# Patient Record
Sex: MALE | URBAN - NONMETROPOLITAN AREA
[De-identification: names, ages, dates, MRNs, and addresses within clinical notes are randomized per-mention and may not be internally consistent; named-entity substitution may affect disease eponyms.]

---

## 2021-08-23 ENCOUNTER — DOCTOR'S OFFICE (OUTPATIENT)
Dept: URBAN - NONMETROPOLITAN AREA CLINIC 1 | Facility: CLINIC | Age: 56
Setting detail: OPHTHALMOLOGY
End: 2021-08-23
Payer: COMMERCIAL

## 2021-08-23 ENCOUNTER — RX ONLY (RX ONLY)
Age: 56
End: 2021-08-23

## 2021-08-23 DIAGNOSIS — I63.9: ICD-10-CM

## 2021-08-23 DIAGNOSIS — H52.4: ICD-10-CM

## 2021-08-23 DIAGNOSIS — H52.03: ICD-10-CM

## 2021-08-23 PROCEDURE — 92004 COMPRE OPH EXAM NEW PT 1/>: CPT | Performed by: OPTOMETRIST

## 2021-08-23 PROCEDURE — 92083 EXTENDED VISUAL FIELD XM: CPT | Performed by: OPTOMETRIST

## 2021-08-23 PROCEDURE — S0620 ROUTINE OPHTHALMOLOGICAL EXA: HCPCS | Performed by: OPTOMETRIST

## 2021-08-23 PROCEDURE — 92015 DETERMINE REFRACTIVE STATE: CPT | Performed by: OPTOMETRIST

## 2021-08-23 ASSESSMENT — SPHEQUIV_DERIVED
OD_SPHEQUIV: 2.125
OD_SPHEQUIV: 2.125
OS_SPHEQUIV: 2.375
OS_SPHEQUIV: 2.625

## 2021-08-23 ASSESSMENT — REFRACTION_MANIFEST
OS_CYLINDER: -0.75
OD_VA1: 20/20-2
OD_SPHERE: +2.50
OD_VA2: 20/20-2
OD_AXIS: 095
OS_SPHERE: +2.75
OS_VA1: 20/20-2
OS_AXIS: 110
OD_ADD: +2.50
OS_ADD: +2.50
OS_VA2: 20/20-2
OD_CYLINDER: -0.75

## 2021-08-23 ASSESSMENT — REFRACTION_CURRENTRX
OD_OVR_VA: 20/
OD_AXIS: 89
OD_ADD: +2.00
OS_ADD: +2.00
OS_OVR_VA: 20/
OD_CYLINDER: -0.75
OS_SPHERE: +2.25
OD_SPHERE: +2.25
OS_CYLINDER: SPH

## 2021-08-23 ASSESSMENT — REFRACTION_AUTOREFRACTION
OD_CYLINDER: -0.75
OS_SPHERE: +3.00
OD_AXIS: 95
OS_AXIS: 111
OD_SPHERE: +2.50
OS_CYLINDER: -0.75

## 2021-08-23 ASSESSMENT — CONFRONTATIONAL VISUAL FIELD TEST (CVF)
OD_FINDINGS: CONSTRICTION
OS_FINDINGS: CONSTRICTION

## 2021-08-23 ASSESSMENT — VISUAL ACUITY
OS_BCVA: 20/30+2
OD_BCVA: 20/25-1

## 2022-08-24 ENCOUNTER — DOCTOR'S OFFICE (OUTPATIENT)
Dept: URBAN - NONMETROPOLITAN AREA CLINIC 1 | Facility: CLINIC | Age: 57
Setting detail: OPHTHALMOLOGY
End: 2022-08-24
Payer: COMMERCIAL

## 2022-08-24 ENCOUNTER — OPTICAL OFFICE (OUTPATIENT)
Dept: URBAN - NONMETROPOLITAN AREA CLINIC 4 | Facility: CLINIC | Age: 57
Setting detail: OPHTHALMOLOGY
End: 2022-08-24
Payer: COMMERCIAL

## 2022-08-24 DIAGNOSIS — H52.03: ICD-10-CM

## 2022-08-24 DIAGNOSIS — I63.9: ICD-10-CM

## 2022-08-24 DIAGNOSIS — H52.223: ICD-10-CM

## 2022-08-24 DIAGNOSIS — H52.4: ICD-10-CM

## 2022-08-24 PROCEDURE — V2781 PROGRESSIVE LENS PER LENS: HCPCS | Performed by: OPTOMETRIST

## 2022-08-24 PROCEDURE — 92083 EXTENDED VISUAL FIELD XM: CPT | Performed by: OPTOMETRIST

## 2022-08-24 PROCEDURE — V2020 VISION SVCS FRAMES PURCHASES: HCPCS | Performed by: OPTOMETRIST

## 2022-08-24 PROCEDURE — 92015 DETERMINE REFRACTIVE STATE: CPT | Performed by: OPTOMETRIST

## 2022-08-24 PROCEDURE — S0621 ROUTINE OPHTHALMOLOGICAL EXA: HCPCS | Performed by: OPTOMETRIST

## 2022-08-24 PROCEDURE — 92014 COMPRE OPH EXAM EST PT 1/>: CPT | Performed by: OPTOMETRIST

## 2022-08-24 ASSESSMENT — REFRACTION_CURRENTRX
OS_VPRISM_DIRECTION: SV
OS_SPHERE: +1.50
OD_CYLINDER: 0.00
OD_AXIS: 180
OD_OVR_VA: 20/
OD_SPHERE: +1.50
OS_OVR_VA: 20/
OS_CYLINDER: 0.00
OS_AXIS: 180
OD_VPRISM_DIRECTION: SV

## 2022-08-24 ASSESSMENT — SPHEQUIV_DERIVED
OS_SPHEQUIV: 2.625
OD_SPHEQUIV: 2.125
OS_SPHEQUIV: 2.375
OD_SPHEQUIV: 2.125

## 2022-08-24 ASSESSMENT — REFRACTION_MANIFEST
OD_SPHERE: +2.50
OD_VA1: 20/20-2
OD_AXIS: 095
OD_CYLINDER: -0.75
OS_VA2: 20/20-2
OS_CYLINDER: -0.75
OS_AXIS: 110
OS_ADD: +2.50
OS_SPHERE: +2.75
OS_VA1: 20/20-2
OD_VA2: 20/20-2
OD_ADD: +2.50

## 2022-08-24 ASSESSMENT — REFRACTION_AUTOREFRACTION
OS_CYLINDER: -0.75
OS_SPHERE: +3.00
OD_SPHERE: +2.50
OD_AXIS: 95
OD_CYLINDER: -0.75
OS_AXIS: 111

## 2022-08-24 ASSESSMENT — CONFRONTATIONAL VISUAL FIELD TEST (CVF)
OS_FINDINGS: CONSTRICTION
OD_FINDINGS: CONSTRICTION

## 2022-08-24 ASSESSMENT — TONOMETRY
OS_IOP_MMHG: 14
OD_IOP_MMHG: 14

## 2022-08-24 ASSESSMENT — VISUAL ACUITY
OD_BCVA: 20/40+2
OS_BCVA: 20/30-2

## 2023-03-30 ENCOUNTER — HOSPITAL ENCOUNTER (INPATIENT)
Facility: HOSPITAL | Age: 58
LOS: 3 days | Discharge: HOME | End: 2023-04-02
Attending: EMERGENCY MEDICINE | Admitting: FAMILY MEDICINE

## 2023-03-30 ENCOUNTER — APPOINTMENT (EMERGENCY)
Dept: CT IMAGING | Facility: HOSPITAL | Age: 58
End: 2023-03-30

## 2023-03-30 DIAGNOSIS — I25.10 CORONARY ARTERY DISEASE INVOLVING NATIVE CORONARY ARTERY OF NATIVE HEART WITHOUT ANGINA PECTORIS: ICD-10-CM

## 2023-03-30 DIAGNOSIS — R10.84 GENERALIZED ABDOMINAL PAIN: Primary | ICD-10-CM

## 2023-03-30 DIAGNOSIS — K56.609 SMALL BOWEL OBSTRUCTION (HCC): ICD-10-CM

## 2023-03-30 LAB
ALBUMIN SERPL BCP-MCNC: 4.3 G/DL (ref 3.5–5)
ALP SERPL-CCNC: 125 U/L (ref 34–104)
ALT SERPL W P-5'-P-CCNC: 19 U/L (ref 7–52)
ANION GAP SERPL CALCULATED.3IONS-SCNC: 9 MMOL/L (ref 4–13)
AST SERPL W P-5'-P-CCNC: 9 U/L (ref 13–39)
BASOPHILS # BLD AUTO: 0.05 THOUSANDS/ÂΜL (ref 0–0.1)
BASOPHILS NFR BLD AUTO: 0 % (ref 0–1)
BILIRUB SERPL-MCNC: 0.48 MG/DL (ref 0.2–1)
BUN SERPL-MCNC: 11 MG/DL (ref 5–25)
CALCIUM SERPL-MCNC: 9.8 MG/DL (ref 8.4–10.2)
CHLORIDE SERPL-SCNC: 105 MMOL/L (ref 96–108)
CO2 SERPL-SCNC: 27 MMOL/L (ref 21–32)
CREAT SERPL-MCNC: 0.96 MG/DL (ref 0.6–1.3)
EOSINOPHIL # BLD AUTO: 0.21 THOUSAND/ÂΜL (ref 0–0.61)
EOSINOPHIL NFR BLD AUTO: 1 % (ref 0–6)
ERYTHROCYTE [DISTWIDTH] IN BLOOD BY AUTOMATED COUNT: 13.5 % (ref 11.6–15.1)
GFR SERPL CREATININE-BSD FRML MDRD: 86 ML/MIN/1.73SQ M
GLUCOSE SERPL-MCNC: 102 MG/DL (ref 65–140)
HCT VFR BLD AUTO: 51.2 % (ref 36.5–49.3)
HGB BLD-MCNC: 16.4 G/DL (ref 12–17)
IMM GRANULOCYTES # BLD AUTO: 0.04 THOUSAND/UL (ref 0–0.2)
IMM GRANULOCYTES NFR BLD AUTO: 0 % (ref 0–2)
LIPASE SERPL-CCNC: 18 U/L (ref 11–82)
LYMPHOCYTES # BLD AUTO: 2.83 THOUSANDS/ÂΜL (ref 0.6–4.47)
LYMPHOCYTES NFR BLD AUTO: 19 % (ref 14–44)
MCH RBC QN AUTO: 28.5 PG (ref 26.8–34.3)
MCHC RBC AUTO-ENTMCNC: 32 G/DL (ref 31.4–37.4)
MCV RBC AUTO: 89 FL (ref 82–98)
MONOCYTES # BLD AUTO: 0.85 THOUSAND/ÂΜL (ref 0.17–1.22)
MONOCYTES NFR BLD AUTO: 6 % (ref 4–12)
NEUTROPHILS # BLD AUTO: 10.93 THOUSANDS/ÂΜL (ref 1.85–7.62)
NEUTS SEG NFR BLD AUTO: 74 % (ref 43–75)
NRBC BLD AUTO-RTO: 0 /100 WBCS
PLATELET # BLD AUTO: 350 THOUSANDS/UL (ref 149–390)
PMV BLD AUTO: 9.5 FL (ref 8.9–12.7)
POTASSIUM SERPL-SCNC: 3.9 MMOL/L (ref 3.5–5.3)
PROT SERPL-MCNC: 6.8 G/DL (ref 6.4–8.4)
RBC # BLD AUTO: 5.76 MILLION/UL (ref 3.88–5.62)
SODIUM SERPL-SCNC: 141 MMOL/L (ref 135–147)
WBC # BLD AUTO: 14.91 THOUSAND/UL (ref 4.31–10.16)

## 2023-03-30 RX ORDER — ALBUTEROL SULFATE 90 UG/1
2 AEROSOL, METERED RESPIRATORY (INHALATION) 4 TIMES DAILY
COMMUNITY
Start: 2022-11-18

## 2023-03-30 RX ORDER — SERTRALINE HYDROCHLORIDE 25 MG/1
25 TABLET, FILM COATED ORAL DAILY
COMMUNITY
Start: 2022-12-29

## 2023-03-30 RX ORDER — SODIUM CHLORIDE, SODIUM GLUCONATE, SODIUM ACETATE, POTASSIUM CHLORIDE, MAGNESIUM CHLORIDE, SODIUM PHOSPHATE, DIBASIC, AND POTASSIUM PHOSPHATE .53; .5; .37; .037; .03; .012; .00082 G/100ML; G/100ML; G/100ML; G/100ML; G/100ML; G/100ML; G/100ML
75 INJECTION, SOLUTION INTRAVENOUS CONTINUOUS
Status: DISCONTINUED | OUTPATIENT
Start: 2023-03-30 | End: 2023-04-01

## 2023-03-30 RX ORDER — FENTANYL CITRATE 50 UG/ML
100 INJECTION, SOLUTION INTRAMUSCULAR; INTRAVENOUS ONCE
Status: COMPLETED | OUTPATIENT
Start: 2023-03-30 | End: 2023-03-30

## 2023-03-30 RX ORDER — MORPHINE SULFATE 4 MG/ML
4 INJECTION, SOLUTION INTRAMUSCULAR; INTRAVENOUS ONCE
Status: COMPLETED | OUTPATIENT
Start: 2023-03-30 | End: 2023-03-30

## 2023-03-30 RX ORDER — ATORVASTATIN CALCIUM 40 MG/1
40 TABLET, FILM COATED ORAL DAILY
COMMUNITY
Start: 2022-12-29

## 2023-03-30 RX ORDER — BISACODYL 10 MG
10 SUPPOSITORY, RECTAL RECTAL
COMMUNITY

## 2023-03-30 RX ORDER — ESOMEPRAZOLE MAGNESIUM 40 MG/1
1 CAPSULE, DELAYED RELEASE ORAL
COMMUNITY
Start: 2023-01-13

## 2023-03-30 RX ORDER — LORAZEPAM 2 MG/ML
0.5 INJECTION INTRAMUSCULAR EVERY 6 HOURS PRN
Status: DISCONTINUED | OUTPATIENT
Start: 2023-03-30 | End: 2023-04-02 | Stop reason: HOSPADM

## 2023-03-30 RX ORDER — ONDANSETRON 2 MG/ML
INJECTION INTRAMUSCULAR; INTRAVENOUS
Status: COMPLETED
Start: 2023-03-30 | End: 2023-03-30

## 2023-03-30 RX ORDER — EZETIMIBE 10 MG/1
10 TABLET ORAL DAILY
COMMUNITY
Start: 2023-03-14

## 2023-03-30 RX ORDER — ASPIRIN 81 MG/1
81 TABLET, CHEWABLE ORAL DAILY
COMMUNITY
Start: 2023-03-14

## 2023-03-30 RX ORDER — ONDANSETRON 2 MG/ML
4 INJECTION INTRAMUSCULAR; INTRAVENOUS ONCE
Status: COMPLETED | OUTPATIENT
Start: 2023-03-30 | End: 2023-03-30

## 2023-03-30 RX ORDER — POLYETHYLENE GLYCOL 3350 17 G/17G
17 POWDER, FOR SOLUTION ORAL
COMMUNITY

## 2023-03-30 RX ORDER — FLUTICASONE PROPIONATE 50 MCG
2 SPRAY, SUSPENSION (ML) NASAL DAILY
COMMUNITY
Start: 2023-03-14

## 2023-03-30 RX ORDER — ALBUTEROL SULFATE 90 UG/1
2 AEROSOL, METERED RESPIRATORY (INHALATION) EVERY 4 HOURS PRN
Status: DISCONTINUED | OUTPATIENT
Start: 2023-03-31 | End: 2023-04-02 | Stop reason: HOSPADM

## 2023-03-30 RX ORDER — LISINOPRIL 10 MG/1
10 TABLET ORAL DAILY
Status: ON HOLD | COMMUNITY
Start: 2022-12-30 | End: 2023-04-02 | Stop reason: SDUPTHER

## 2023-03-30 RX ORDER — NAPROXEN 375 MG/1
375 TABLET ORAL 2 TIMES DAILY
COMMUNITY
Start: 2022-12-29

## 2023-03-30 RX ORDER — ENOXAPARIN SODIUM 100 MG/ML
40 INJECTION SUBCUTANEOUS DAILY
Status: DISCONTINUED | OUTPATIENT
Start: 2023-03-31 | End: 2023-03-31

## 2023-03-30 RX ORDER — IBUPROFEN 600 MG/1
600 TABLET ORAL EVERY 6 HOURS PRN
COMMUNITY
Start: 2022-10-07 | End: 2023-10-07

## 2023-03-30 RX ORDER — PANTOPRAZOLE SODIUM 40 MG/10ML
40 INJECTION, POWDER, LYOPHILIZED, FOR SOLUTION INTRAVENOUS
Status: DISCONTINUED | OUTPATIENT
Start: 2023-03-31 | End: 2023-04-02 | Stop reason: HOSPADM

## 2023-03-30 RX ORDER — ONDANSETRON 2 MG/ML
4 INJECTION INTRAMUSCULAR; INTRAVENOUS EVERY 6 HOURS PRN
Status: DISCONTINUED | OUTPATIENT
Start: 2023-03-30 | End: 2023-04-02 | Stop reason: HOSPADM

## 2023-03-30 RX ADMIN — FENTANYL CITRATE 100 MCG: 50 INJECTION INTRAMUSCULAR; INTRAVENOUS at 21:12

## 2023-03-30 RX ADMIN — SODIUM CHLORIDE, SODIUM GLUCONATE, SODIUM ACETATE, POTASSIUM CHLORIDE AND MAGNESIUM CHLORIDE 75 ML/HR: 526; 502; 368; 37; 30 INJECTION, SOLUTION INTRAVENOUS at 22:49

## 2023-03-30 RX ADMIN — ONDANSETRON 4 MG: 2 INJECTION INTRAMUSCULAR; INTRAVENOUS at 19:18

## 2023-03-30 RX ADMIN — IOHEXOL 100 ML: 350 INJECTION, SOLUTION INTRAVENOUS at 19:40

## 2023-03-30 RX ADMIN — MORPHINE SULFATE 4 MG: 4 INJECTION INTRAVENOUS at 19:12

## 2023-03-30 NOTE — ED PROVIDER NOTES
History  Chief Complaint   Patient presents with   • Abdominal Pain     Abd pain sudden onset @ 8am today after BM  HX small bowel obstr  49-year-old male with a history of previous bowel obstruction presents emergency room complaining of severe abdominal pain which began after a bowel movement around 8:00 this morning  Is been continuous and nonradiating  Patient has no fevers or chills  No urinary complaints  Has had no nausea or vomiting  No diarrhea  Patient appears uncomfortable at this time  Patient has a history as well of myocardial infarction and CVA with residual left-sided paralysis  History provided by:  Patient  Abdominal Pain  Pain location:  Generalized  Pain quality: aching and bloating    Pain radiates to:  Does not radiate  Pain severity:  Severe  Onset quality:  Sudden  Duration:  11 hours  Timing:  Constant  Progression:  Worsening  Chronicity:  Recurrent  Context: not previous surgeries    Relieved by:  None tried  Worsened by:  Palpation and movement  Ineffective treatments:  None tried  Associated symptoms: flatus    Associated symptoms: no cough, no diarrhea, no dysuria, no hematemesis, no hematochezia, no nausea, no shortness of breath and no vomiting    Risk factors: has not had multiple surgeries        Prior to Admission Medications   Prescriptions Last Dose Informant Patient Reported? Taking?    albuterol (PROVENTIL HFA,VENTOLIN HFA) 90 mcg/act inhaler 3/30/2023  Yes Yes   Sig: Inhale 2 puffs 4 (four) times a day   aspirin 81 mg chewable tablet 3/30/2023  Yes Yes   Sig: Chew 81 mg daily   atorvastatin (LIPITOR) 40 mg tablet 3/30/2023  Yes Yes   Sig: Take 40 mg by mouth daily   bisacodyl (DULCOLAX) 10 mg suppository 3/30/2023  Yes Yes   Sig: Insert 10 mg into the rectum   esomeprazole (NexIUM) 40 MG capsule 3/30/2023  Yes Yes   Sig: Take 1 capsule by mouth daily before breakfast   ezetimibe (ZETIA) 10 mg tablet 3/30/2023  Yes Yes   Sig: Take 10 mg by mouth daily fluticasone (FLONASE) 50 mcg/act nasal spray 3/30/2023  Yes Yes   Si sprays into each nostril daily   ibuprofen (MOTRIN) 600 mg tablet 3/30/2023  Yes Yes   Sig: Take 600 mg by mouth every 6 (six) hours as needed   lisinopril (ZESTRIL) 10 mg tablet 3/30/2023  Yes Yes   Sig: Take 10 mg by mouth daily   naproxen (NAPROSYN) 375 mg tablet 3/30/2023  Yes Yes   Sig: Take 375 mg by mouth 2 (two) times a day   polyethylene glycol (GLYCOLAX) 17 GM/SCOOP powder 3/30/2023  Yes Yes   Sig: Take 17 g by mouth   sertraline (ZOLOFT) 25 mg tablet 3/30/2023  Yes Yes   Sig: Take 25 mg by mouth daily      Facility-Administered Medications: None       Past Medical History:   Diagnosis Date   • Bowel obstruction (HCC)    • MI (myocardial infarction) ([facility] [address] )    • Stroke ([facility] )        No past surgical history on file  No family history on file  I have reviewed and agree with the history as documented  E-Cigarette/Vaping   • E-Cigarette Use Never User      E-Cigarette/Vaping Substances     Social History     Tobacco Use   • Smoking status: Every Day     Packs/day: 0 50     Types: Cigarettes   Vaping Use   • Vaping Use: Never used   Substance Use Topics   • Alcohol use: Never   • Drug use: Never       Review of Systems   Respiratory: Negative  Negative for cough and shortness of breath  Cardiovascular: Negative  Gastrointestinal: Positive for abdominal pain and flatus  Negative for diarrhea, hematemesis, hematochezia, nausea and vomiting  Genitourinary: Negative  Negative for dysuria, flank pain, frequency and urgency  Musculoskeletal: Negative for back pain and myalgias  All other systems reviewed and are negative  Physical Exam  Physical Exam  Vitals and nursing note reviewed  Constitutional:       General: He is in acute distress  Appearance: Normal appearance  He is well-developed  He is not ill-appearing or toxic-appearing  HENT:      Head: Normocephalic and atraumatic   Hair is normal       Jaw: No pain on movement  Right Ear: External ear normal       Left Ear: External ear normal       Nose: Nose normal  No congestion  Mouth/Throat:      Mouth: Mucous membranes are moist       Dentition: Dental caries present  Eyes:      General: Lids are normal  No scleral icterus  Extraocular Movements: Extraocular movements intact  Conjunctiva/sclera: Conjunctivae normal       Pupils: Pupils are equal, round, and reactive to light  Cardiovascular:      Rate and Rhythm: Normal rate and regular rhythm  Heart sounds: Normal heart sounds  No murmur heard  Pulmonary:      Effort: Pulmonary effort is normal  No respiratory distress  Breath sounds: Normal breath sounds  No decreased breath sounds, wheezing, rhonchi or rales  Abdominal:      General: Abdomen is flat  Bowel sounds are normal  There is no distension  Palpations: Abdomen is soft  Abdomen is not rigid  Tenderness: There is generalized abdominal tenderness  There is no right CVA tenderness, left CVA tenderness, guarding or rebound  Musculoskeletal:         General: No swelling, tenderness, deformity or signs of injury  Normal range of motion  Cervical back: Normal range of motion and neck supple  Skin:     General: Skin is warm and dry  Coloration: Skin is not pale  Findings: No rash  Neurological:      General: No focal deficit present  Mental Status: He is alert and oriented to person, place, and time  Mental status is at baseline     Psychiatric:         Attention and Perception: Attention normal          Mood and Affect: Mood normal          Speech: Speech normal          Behavior: Behavior normal          Vital Signs  ED Triage Vitals [03/30/23 1900]   Temp Pulse Respirations Blood Pressure SpO2   -- 69 (!) 24 150/98 97 %      Temp src Heart Rate Source Patient Position - Orthostatic VS BP Location FiO2 (%)   -- -- -- -- --      Pain Score       10 - Worst Possible Pain           Vitals: 03/30/23 1915 03/30/23 1930 03/30/23 2000 03/30/23 2015   BP: 133/76 110/69 152/86 (!) 173/93   Pulse: 64 60 58 75         Visual Acuity      ED Medications  Medications   fentanyl citrate (PF) 100 MCG/2ML 100 mcg (has no administration in time range)   morphine injection 4 mg (4 mg Intravenous Given 3/30/23 1912)   ondansetron (ZOFRAN) injection 4 mg (4 mg Intravenous Given 3/30/23 1918)   iohexol (OMNIPAQUE) 350 MG/ML injection (SINGLE-DOSE) 100 mL (100 mL Intravenous Given 3/30/23 1940)       Diagnostic Studies  Results Reviewed     Procedure Component Value Units Date/Time    Comprehensive metabolic panel [[id number]]  (Abnormal) Collected: 03/30/23 1909    Lab Status: Final result Specimen: Blood from Arm, Left Updated: 03/30/23 1930     Sodium 141 mmol/L      Potassium 3 9 mmol/L      Chloride 105 mmol/L      CO2 27 mmol/L      ANION GAP 9 mmol/L      BUN 11 mg/dL      Creatinine 0 96 mg/dL      Glucose 102 mg/dL      Calcium 9 8 mg/dL      AST 9 U/L      ALT 19 U/L      Alkaline Phosphatase 125 U/L      Total Protein 6 8 g/dL      Albumin 4 3 g/dL      Total Bilirubin 0 48 mg/dL      eGFR 86 ml/min/1 73sq m     Narrative:      [place] guidelines for Chronic Kidney Disease (CKD):   •  Stage 1 with normal or high GFR (GFR > 90 mL/min/1 73 square meters)  •  Stage 2 Mild CKD (GFR = 60-89 mL/min/1 73 square meters)  •  Stage 3A Moderate CKD (GFR = 45-59 mL/min/1 73 square meters)  •  Stage 3B Moderate CKD (GFR = 30-44 mL/min/1 73 square meters)  •  Stage 4 Severe CKD (GFR = 15-29 mL/min/1 73 square meters)  •  Stage 5 End Stage CKD (GFR <15 mL/min/1 73 square meters)  Note: GFR calculation is accurate only with a steady state creatinine    Lipase [[id number]]  (Normal) Collected: 03/30/23 1909    Lab Status: Final result Specimen: Blood from Arm, Left Updated: 03/30/23 1930     Lipase 18 u/L     CBC and differential [[id number]]  (Abnormal) Collected: 03/30/23 9154    Lab Status: Final result Specimen: Blood from Arm, Left Updated: 03/30/23 1915     WBC 14 91 Thousand/uL      RBC 5 76 Million/uL      Hemoglobin 16 4 g/dL      Hematocrit 51 2 %      MCV 89 fL      MCH 28 5 pg      MCHC 32 0 g/dL      RDW 13 5 %      MPV 9 5 fL      Platelets 870 Thousands/uL      nRBC 0 /100 WBCs      Neutrophils Relative 74 %      Immat GRANS % 0 %      Lymphocytes Relative 19 %      Monocytes Relative 6 %      Eosinophils Relative 1 %      Basophils Relative 0 %      Neutrophils Absolute 10 93 Thousands/µL      Immature Grans Absolute 0 04 Thousand/uL      Lymphocytes Absolute 2 83 Thousands/µL      Monocytes Absolute 0 85 Thousand/µL      Eosinophils Absolute 0 21 Thousand/µL      Basophils Absolute 0 05 Thousands/µL                  CT abdomen pelvis with contrast   Final Result by Vinh Bass MD (03/30 2050)      Multiple dilated loops of small bowel with transition point in the right lower quadrant compatible with small bowel obstruction  Transition point is at a loop of small bowel demonstrating moderate wall thickening, underlying infectious or inflammatory    enteritis is not excluded  The study was marked in [facility] for immediate notification  Workstation performed: [id number]                    Procedures  Procedures         ED Course  ED Course as of 03/30/23 2111   Thu Mar 30, 2023   2055 CT consistent with small bowel obstruction  2106 Discussed case with surgery  Patient may be admitted to our facility  Will refer to medicine  Patient should be n p o  and NG tube if he continues to vomit  SBIRT 20yo+    Flowsheet Row Most Recent Value   SBIRT (23 yo +)    In order to provide better care to our patients, we are screening all of our patients for alcohol and drug use  Would it be okay to ask you these screening questions? Yes Filed at: 03/30/2023 1901   Initial Alcohol Screen: US AUDIT-C     1   How often do you have a drink containing alcohol? 0 Filed at: 03/30/2023 1901   2  How many drinks containing alcohol do you have on a typical day you are drinking? 0 Filed at: 03/30/2023 1901   3a  Male UNDER 65: How often do you have five or more drinks on one occasion? 0 Filed at: 03/30/2023 1901   Audit-C Score 0 Filed at: 03/30/2023 1901   TONY: How many times in the past year have you    Used an illegal drug or used a prescription medication for non-medical reasons? Never Filed at: 03/30/2023 1901                    Medical Decision Making  Patient presented to the emergency department and a MSE was performed  The patient was evaluated for complaint related to acute abdominal pain in a male patient  Patient is potentially at risk for, but not limited to, acute gastritis, pancreatitis, biliary colic, cholecystitis, urinary infection, kidney stone, appendicitis, diverticulitis, diverticulosis, ulcerative colitis, Crohn's disease, enteritis, bowel obstruction, viral gastroenteritis, constipation, or other disease process unrelated to the abdomen which may cause this symptomatology is also considered  Several of these diagnoses have been evaluated and ruled out by history and physical   As needed, patient will be further evaluated with laboratory and imaging studies  Higher level diagnostics, such as CT imaging or ultrasound, may also be required  Please see work-up portion of the note for further evaluation of patient's risk  Socioeconomic factors were also considered as part of the decision-making process  Unless otherwise stated in the chart or patient is admitted as elsewhere documented, any previously prescribed medications will be maintained  Generalized abdominal pain: acute illness or injury  Small bowel obstruction ([facility] [address] ): acute illness or injury  Amount and/or Complexity of Data Reviewed  Labs: ordered  Decision-making details documented in ED Course  Radiology: ordered  Decision-making details documented in ED Course        Risk  Prescription drug management  Decision regarding hospitalization  Risk Details: Patient presented to the emergency department and a MSE was performed  The patient was evaluated and diagnosed with acute bowel obstruction  This is a new issue that will require additional planned work-up and treatment in a hospitalized setting  As may have been required as part of this evaluation, clinical laboratory test, radiology imaging and medical testing (I e  EKG) were ordered as necessitated by the patient's presentation  I independently reviewed these studies, imaging and testing  This patient's case is considered to be a considerable risk secondary to the above listed disease process and poses a threat to the patient's well-being and baseline function  Further in-patient diagnostic testing and management, which may include the administration of parenteral medications, is required  Disposition  Final diagnoses:   Generalized abdominal pain   Small bowel obstruction ([address] )     Time reflects when diagnosis was documented in both MDM as applicable and the Disposition within this note     Time User Action Codes Description Comment    3/30/2023  9:08 PM Kris Bright Add [R10 84] Generalized abdominal pain     3/30/2023  9:08 PM Kris Galarza [K56 [id number]] Small bowel obstruction [facility])       ED Disposition     ED Disposition   Admit    Condition   Stable    Date/Time   Thu Mar 30, 2023  9:10 PM    Comment              Follow-up Information    None         Patient's Medications   Discharge Prescriptions    No medications on file       No discharge procedures on file      PDMP Review     None          ED Provider  Electronically Signed by           Mc Mcginnis DO  03/30/23 6609

## 2023-03-31 PROBLEM — I69.359 CVA, OLD, HEMIPARESIS (HCC): Status: ACTIVE | Noted: 2023-03-31

## 2023-03-31 PROBLEM — I25.10 CORONARY ARTERY DISEASE INVOLVING NATIVE CORONARY ARTERY OF NATIVE HEART: Status: ACTIVE | Noted: 2023-03-31

## 2023-03-31 PROBLEM — Z72.0 TOBACCO ABUSE: Status: ACTIVE | Noted: 2023-03-31

## 2023-03-31 LAB
ANION GAP SERPL CALCULATED.3IONS-SCNC: 8 MMOL/L (ref 4–13)
BASOPHILS # BLD AUTO: 0.04 THOUSANDS/ÂΜL (ref 0–0.1)
BASOPHILS NFR BLD AUTO: 0 % (ref 0–1)
BUN SERPL-MCNC: 11 MG/DL (ref 5–25)
CALCIUM SERPL-MCNC: 9.1 MG/DL (ref 8.4–10.2)
CHLORIDE SERPL-SCNC: 107 MMOL/L (ref 96–108)
CO2 SERPL-SCNC: 23 MMOL/L (ref 21–32)
CREAT SERPL-MCNC: 0.86 MG/DL (ref 0.6–1.3)
EOSINOPHIL # BLD AUTO: 0.23 THOUSAND/ÂΜL (ref 0–0.61)
EOSINOPHIL NFR BLD AUTO: 2 % (ref 0–6)
ERYTHROCYTE [DISTWIDTH] IN BLOOD BY AUTOMATED COUNT: 13.5 % (ref 11.6–15.1)
GFR SERPL CREATININE-BSD FRML MDRD: 95 ML/MIN/1.73SQ M
GLUCOSE SERPL-MCNC: 103 MG/DL (ref 65–140)
HCT VFR BLD AUTO: 49.6 % (ref 36.5–49.3)
HGB BLD-MCNC: 15.8 G/DL (ref 12–17)
IMM GRANULOCYTES # BLD AUTO: 0.04 THOUSAND/UL (ref 0–0.2)
IMM GRANULOCYTES NFR BLD AUTO: 0 % (ref 0–2)
LYMPHOCYTES # BLD AUTO: 1.83 THOUSANDS/ÂΜL (ref 0.6–4.47)
LYMPHOCYTES NFR BLD AUTO: 17 % (ref 14–44)
MAGNESIUM SERPL-MCNC: 1.9 MG/DL (ref 1.9–2.7)
MCH RBC QN AUTO: 28.7 PG (ref 26.8–34.3)
MCHC RBC AUTO-ENTMCNC: 31.9 G/DL (ref 31.4–37.4)
MCV RBC AUTO: 90 FL (ref 82–98)
MONOCYTES # BLD AUTO: 0.73 THOUSAND/ÂΜL (ref 0.17–1.22)
MONOCYTES NFR BLD AUTO: 7 % (ref 4–12)
NEUTROPHILS # BLD AUTO: 7.99 THOUSANDS/ÂΜL (ref 1.85–7.62)
NEUTS SEG NFR BLD AUTO: 74 % (ref 43–75)
NRBC BLD AUTO-RTO: 0 /100 WBCS
PHOSPHATE SERPL-MCNC: 3.6 MG/DL (ref 2.7–4.5)
PLATELET # BLD AUTO: 317 THOUSANDS/UL (ref 149–390)
PMV BLD AUTO: 9.4 FL (ref 8.9–12.7)
POTASSIUM SERPL-SCNC: 4.7 MMOL/L (ref 3.5–5.3)
RBC # BLD AUTO: 5.51 MILLION/UL (ref 3.88–5.62)
SODIUM SERPL-SCNC: 138 MMOL/L (ref 135–147)
WBC # BLD AUTO: 10.86 THOUSAND/UL (ref 4.31–10.16)

## 2023-03-31 RX ORDER — HYDROMORPHONE HCL/PF 1 MG/ML
0.5 SYRINGE (ML) INJECTION
Status: DISCONTINUED | OUTPATIENT
Start: 2023-03-31 | End: 2023-04-02 | Stop reason: HOSPADM

## 2023-03-31 RX ORDER — MORPHINE SULFATE 4 MG/ML
4 INJECTION, SOLUTION INTRAMUSCULAR; INTRAVENOUS
Status: DISCONTINUED | OUTPATIENT
Start: 2023-03-31 | End: 2023-04-02 | Stop reason: HOSPADM

## 2023-03-31 RX ORDER — HEPARIN SODIUM 5000 [USP'U]/ML
5000 INJECTION, SOLUTION INTRAVENOUS; SUBCUTANEOUS EVERY 8 HOURS SCHEDULED
Status: DISCONTINUED | OUTPATIENT
Start: 2023-03-31 | End: 2023-04-02 | Stop reason: HOSPADM

## 2023-03-31 RX ORDER — HYDROMORPHONE HCL/PF 1 MG/ML
0.5 SYRINGE (ML) INJECTION ONCE
Status: COMPLETED | OUTPATIENT
Start: 2023-03-31 | End: 2023-03-31

## 2023-03-31 RX ADMIN — SODIUM CHLORIDE, SODIUM GLUCONATE, SODIUM ACETATE, POTASSIUM CHLORIDE AND MAGNESIUM CHLORIDE 75 ML/HR: 526; 502; 368; 37; 30 INJECTION, SOLUTION INTRAVENOUS at 21:20

## 2023-03-31 RX ADMIN — PANTOPRAZOLE SODIUM 40 MG: 40 INJECTION, POWDER, FOR SOLUTION INTRAVENOUS at 08:19

## 2023-03-31 RX ADMIN — SODIUM CHLORIDE, SODIUM GLUCONATE, SODIUM ACETATE, POTASSIUM CHLORIDE AND MAGNESIUM CHLORIDE 75 ML/HR: 526; 502; 368; 37; 30 INJECTION, SOLUTION INTRAVENOUS at 12:08

## 2023-03-31 RX ADMIN — HYDROMORPHONE HYDROCHLORIDE 0.5 MG: 1 INJECTION, SOLUTION INTRAMUSCULAR; INTRAVENOUS; SUBCUTANEOUS at 05:35

## 2023-03-31 RX ADMIN — HEPARIN SODIUM 5000 UNITS: 5000 INJECTION INTRAVENOUS; SUBCUTANEOUS at 21:20

## 2023-03-31 RX ADMIN — HYDROMORPHONE HYDROCHLORIDE 0.5 MG: 1 INJECTION, SOLUTION INTRAMUSCULAR; INTRAVENOUS; SUBCUTANEOUS at 01:18

## 2023-03-31 RX ADMIN — ENOXAPARIN SODIUM 40 MG: 40 INJECTION SUBCUTANEOUS at 08:19

## 2023-03-31 NOTE — ASSESSMENT & PLAN NOTE
· Reports 1/2 pack/day tobacco abuse  · Declines nicotine replacement therapy  · Cessation counseling provided
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
## 2023-03-31 NOTE — ASSESSMENT & PLAN NOTE
· POA with severe abdominal pain without vomiting  · CT abdomen pelvis with SBO, transition point right lower quadrant  · Hx SBO x2 in 2021 hospitalized at [facility] resolved with conservative measures  · No history abdominal surgery  · DVT prophylaxis enoxaparin, SCDs  · GI prophylaxis Protonix  · Continue conservative management, condition improving clinical exam benign    As per surgery recommendation, will advance clear liquid diet, and monitor

## 2023-03-31 NOTE — PLAN OF CARE
Problem: PAIN - ADULT  Goal: Verbalizes/displays adequate comfort level or baseline comfort level  Description: Interventions:  - Encourage patient to monitor pain and request assistance  - Assess pain using appropriate pain scale  - Administer analgesics based on type and severity of pain and evaluate response  - Implement non-pharmacological measures as appropriate and evaluate response  - Consider cultural and social influences on pain and pain management  - Notify physician/advanced practitioner if interventions unsuccessful or patient reports new pain  Outcome: Progressing     Problem: INFECTION - ADULT  Goal: Absence or prevention of progression during hospitalization  Description: INTERVENTIONS:  - Assess and monitor for signs and symptoms of infection  - Monitor lab/diagnostic results  - Monitor all insertion sites, i e  indwelling lines, tubes, and drains  - Monitor endotracheal if appropriate and nasal secretions for changes in amount and color  - [place] appropriate cooling/warming therapies per order  - Administer medications as ordered  - Instruct and encourage patient and family to use good hand hygiene technique  - Identify and instruct in appropriate isolation precautions for identified infection/condition  Outcome: Progressing     Problem: SAFETY ADULT  Goal: Patient will remain free of falls  Description: INTERVENTIONS:  - Educate patient/family on patient safety including physical limitations  - Instruct patient to call for assistance with activity   - Consult OT/PT to assist with strengthening/mobility   - Keep Call bell within reach  - Keep bed low and locked with side rails adjusted as appropriate  - Keep care items and personal belongings within reach  - Initiate and maintain comfort rounds  - Make Fall Risk Sign visible to staff    - Apply yellow socks and bracelet for high fall risk patients  - Consider moving patient to room near nurses station  Outcome: Progressing  Goal: Maintain or return to baseline ADL function  Description: INTERVENTIONS:  -  Assess patient's ability to carry out ADLs; assess patient's baseline for ADL function and identify physical deficits which impact ability to perform ADLs (bathing, care of mouth/teeth, toileting, grooming, dressing, etc )  - Assess/evaluate cause of self-care deficits   - Assess range of motion  - Assess patient's mobility; develop plan if impaired  - Assess patient's need for assistive devices and provide as appropriate  - Encourage maximum independence but intervene and supervise when necessary  - Involve family in performance of ADLs  - Assess for home care needs following discharge   - Consider OT consult to assist with ADL evaluation and planning for discharge  - Provide patient education as appropriate  Outcome: Progressing  Goal: Maintains/Returns to pre admission functional level  Description: INTERVENTIONS:  - Perform BMAT or MOVE assessment daily    - Set and communicate daily mobility goal to care team and patient/family/caregiver     - Collaborate with rehabilitation services on mobility goals if consulted    - Out of bed for toileting  - Record patient progress and toleration of activity level   Outcome: Progressing     Problem: DISCHARGE PLANNING  Goal: Discharge to home or other facility with appropriate resources  Description: INTERVENTIONS:  - Identify barriers to discharge w/patient and caregiver  - Arrange for needed discharge resources and transportation as appropriate  - Identify discharge learning needs (meds, wound care, etc )  - Arrange for interpretive services to assist at discharge as needed  - Refer to Case Management Department for coordinating discharge planning if the patient needs post-hospital services based on physician/advanced practitioner order or complex needs related to functional status, cognitive ability, or social support system  Outcome: Progressing     Problem: Knowledge Deficit  Goal: Patient/family/caregiver demonstrates understanding of disease process, treatment plan, medications, and discharge instructions  Description: Complete learning assessment and assess knowledge base    Interventions:  - Provide teaching at level of understanding  - Provide teaching via preferred learning methods  Outcome: Progressing     Problem: Prexisting or High Potential for Compromised Skin Integrity  Goal: Skin integrity is maintained or improved  Description: INTERVENTIONS:  - Identify patients at risk for skin breakdown  - Assess and monitor skin integrity  - Assess and monitor nutrition and hydration status  - Monitor labs   - Assess for incontinence   - Turn and reposition patient  - Assist with mobility/ambulation  - Relieve pressure over bony prominences  - Avoid friction and shearing  - Provide appropriate hygiene as needed including keeping skin clean and dry  - Evaluate need for skin moisturizer/barrier cream  - Collaborate with interdisciplinary team   - Patient/family teaching  - Consider wound care consult   Outcome: Progressing

## 2023-03-31 NOTE — ASSESSMENT & PLAN NOTE
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

## 2023-03-31 NOTE — ASSESSMENT & PLAN NOTE
· POA with severe abdominal pain without vomiting  · CT abdomen pelvis with SBO, transition point right lower quadrant  · Hx SBO x2 in 2021 hospitalized at [facility] resolved with conservative measures  · No history abdominal surgery  · We will keep n p o  with IV hydration, pain control, antiemetics as necessary  · Appreciate general surgery consultation  · Hold off on NG tube unless condition worsens  · DVT prophylaxis enoxaparin, SCDs  · GI prophylaxis Protonix

## 2023-03-31 NOTE — CASE MANAGEMENT
Case Management Assessment & Discharge Planning Note    Patient name Rosendo Landrum  Location /-71 MRN [id number]  : 1965 Date 3/31/2023       Current Admission Date: 3/30/2023  Current Admission Diagnosis:SBO (small bowel obstruction) [facility])   Patient Active Problem List    Diagnosis Date Noted   • Tobacco abuse 2023   • CVA, old, hemiparesis ([facility] [address] ) 2023   • Coronary artery disease involving native coronary artery of native heart 2023   • SBO (small bowel obstruction) ([facility] [address] ) 2023      LOS (days): 1  Geometric Mean LOS (GMLOS) (days): 3 00  Days to GMLOS:2 2     OBJECTIVE:    Risk of Unplanned Readmission Score: 5 9         Current admission status: Inpatient  Referral Reason:  (Transportation)    Preferred Pharmacy: No Pharmacies Listed  Primary Care Provider: Alondra Lambert MD    Primary Insurance: MEDICARE  Secondary Insurance: [address]:  [facility] Proxies    There are no active Health Care Proxies on file  Advance Directives  Does patient have a [address]?: Yes  Does patient have Advance Directives?: No  Was patient offered paperwork?: Yes (patient declined)  Primary Contact: Catie,          Readmission Root Cause  30 Day Readmission: No    Patient Information  Admitted from[de-identified] Home  Mental Status: Alert  During Assessment patient was accompanied by: Not accompanied during assessment  Assessment information provided by[de-identified] Patient  Primary Caregiver: Self  Support Systems: Home care staff  [place] of Residence: [facility] do you live in?: [address] [place] [address] entry access options   Select all that apply : Elevator  Type of Current Residence: Apartment  Floor Level: 8  Upon entering residence, is there a bedroom on the main floor (no further steps)?: Yes  Upon entering residence, is there a bathroom on the main floor (no further steps)?: Yes  In the last 12 months, was there a time when you were not able to pay the mortgage or rent on time?: No  In the last 12 months, how many places have you lived?: 1  In the last 12 months, was there a time when you did not have a steady place to sleep or slept in a shelter (including now)?: No  Homeless/housing insecurity resource given?: N/A  Living Arrangements: Lives Alone  Is patient a ?: No    Activities of Daily Living Prior to Admission  Functional Status: Assistance  Completes ADLs independently?: No  Level of ADL dependence: Assistance  Ambulates independently?: No  Level of ambulatory dependence: Assistance  Does patient use assisted devices?: Yes  Assisted Devices (DME) used:  Other (Comment), Wheelchair (Motorized scooter)  Does patient currently own DME?: Yes  What DME does the patient currently own?: Wheelchair, Other (Comment) (Motorized scooter)  Does patient have a history of Outpatient Therapy (PT/OT)?: No  Does the patient have a history of Short-Term Rehab?: Yes ([facility])  Does patient have a history of HHC?: Yes  Does patient currently have [facility]?: No    Current Home Health Care  Type of Current Home Care Services: Home health aide    Patient Information Continued  Income Source: SSI/SSD  Does patient have prescription coverage?: Yes  Within the past 12 months, you worried that your food would run out before you got the money to buy more : Never true  Within the past 12 months, the food you bought just didn't last and you didn't have money to get more : Never true  Food insecurity resource given?: N/A  Does patient receive dialysis treatments?: No         Means of Transportation  [facility] Other (Comment) (Motorized scooter)  In the past 12 months, has lack of transportation kept you from medical appointments or from getting medications?: No  In the past 12 months, has lack of transportation kept you from meetings, work, or from getting things needed for daily living?: No  Was application for public transport provided?: N/A        DISCHARGE DETAILS:    Discharge planning discussed with[de-identified] patient  Freedom of Choice: Yes     CM contacted family/caregiver?: No- see comments (patient declined)                            Other Referral/Resources/Interventions Provided:  Interventions: HHA  Referral Comments: [place], [place]                CM met with patient at the bedside, baseline information was obtained  CM discussed the role of CM in helping the patient develop a discharge plan and assist the patient in carry out their plan  Patient lives in 8th floor apt; utilizes [facility] and electric scooter for ambulation  Patient has [facility] 18 hrs/week through [facility]  [facility] for CM discharge referral needs

## 2023-03-31 NOTE — ASSESSMENT & PLAN NOTE
· History CAD status post angioplasty RCA  · Continue aspirin, high intensity statin when tolerating oral intake  · Denies chest pain  · Tobacco cessation discussed with patient

## 2023-03-31 NOTE — CONSULTS
Consultation - General Surgery   Floyd Gates 62 y o  male MRN: [id number]  Unit/Bed#: -01 Encounter: [id number]    Assessment/Plan     Assessment: 61yo male with small bowel obstruction  -VSS and WNL  -WBC 10 86  -HGB 15 8    -CT A/P 3/30/23:  IMPRESSION:   Multiple dilated loops of small bowel with transition point in the right lower quadrant compatible with small bowel obstruction  Transition point is at a loop of small bowel demonstrating moderate wall thickening, underlying infectious or inflammatory   enteritis is not excluded  -Hx of one prior hospitalization for SBO in 2021, treated with [id number]  -No hx of abdominal surgery  Pmhx of stroke with residual L hemiparesis 3yrs ago, CADs/p MI and angioplasty 3 yrs ago, HLD, depression, gerd, tobacco abuse  -Pt's pain has resolved at this point  His last dose of IV pain medication was 4 hrs ago  He has had no N/V or bloating  There is no tenderness on exam  He had been passing flatus  Last BM was 24hrs ago  Plan:  -Conservative management  No need for surgical intervention at this time  -Advance to Clear liquid diet  -Will hold off on NGT at this time as pt has no N/V  -IVF hydration  -Analgesics/antiemetics prn  -Serial abdominal exams       HPI:  Floyd Gates is a 62 y o  male with a PMhx of stroke with residual left-sided hemiparesis, CAD s/p MI, GERD, depression, HLD who presented to the [facility] ED last evening with c/o generalized abdominal pain x 1 day  The pain had began at approx 8am following a BM which he states was solid and nl brown  The pain worsened throughout the day  It was a constant ache which did not radiate  There were no aggrevating or alleviating factors  He denies any nausea, vomiting, bloating, diarrhea, fever, constipation, hematochezia, dysuria, difficulty voiding, SOB or CP  He has hx of one prior SBO requiring hospitalization and NGT  He has no hx of surgery to his abdomen  He has no hx of other GI problems   He reports passing  flatus night and this am  Lab studies including CBC, BPM done in the ED were essentially nl  CT A/P showed a SBO with transition in the RLQ with moderate wall thickening of the sb at transition point  Inpatient consult to Acute Care Surgery  Consult performed by: Amanda Luu PA-C  Consult ordered by: SARAH Greenberg          Review of Systems   Constitutional: Negative for chills and fever  HENT: Negative for congestion, rhinorrhea, sinus pressure and sore throat  Eyes: Negative for pain and visual disturbance  Respiratory: Positive for wheezing  Negative for cough, chest tightness and shortness of breath  Cardiovascular: Negative for chest pain, palpitations and leg swelling  Gastrointestinal: Negative for abdominal distention, abdominal pain, blood in stool, constipation, diarrhea, nausea and vomiting  Genitourinary: Negative for difficulty urinating, dysuria, flank pain and hematuria  Musculoskeletal: Negative for arthralgias and back pain  Skin: Negative for color change and rash  Neurological: Negative for dizziness, seizures and syncope  Left sided-hemiparesis   Psychiatric/Behavioral: Positive for confusion  Negative for agitation  All other systems reviewed and are negative        Historical Information   Past Medical History:   Diagnosis Date   • Bowel obstruction (HCC)    • MI (myocardial infarction) ([facility] [address] )    • Stroke [facility])      Past Surgical History:   Procedure Laterality Date   • BRAIN SURGERY     • CARDIAC SURGERY       Social History   Social History     Substance and Sexual Activity   Alcohol Use Never     Social History     Substance and Sexual Activity   Drug Use Never     E-Cigarette/Vaping   • E-Cigarette Use Never User      E-Cigarette/Vaping Substances     Social History     Tobacco Use   Smoking Status Every Day   • Packs/day: 0 50   • Types: Cigarettes   Smokeless Tobacco Not on file     Family History: non-contributory    Meds/Allergies   PTA meds:   Prior to Admission Medications   Prescriptions Last Dose Informant Patient Reported? Taking?    albuterol (PROVENTIL HFA,VENTOLIN HFA) 90 mcg/act inhaler 3/30/2023  Yes Yes   Sig: Inhale 2 puffs 4 (four) times a day   aspirin 81 mg chewable tablet 3/30/2023  Yes Yes   Sig: Chew 81 mg daily   atorvastatin (LIPITOR) 40 mg tablet 3/30/2023  Yes Yes   Sig: Take 40 mg by mouth daily   bisacodyl (DULCOLAX) 10 mg suppository 3/30/2023  Yes Yes   Sig: Insert 10 mg into the rectum   esomeprazole (NexIUM) 40 MG capsule 3/30/2023  Yes Yes   Sig: Take 1 capsule by mouth daily before breakfast   ezetimibe (ZETIA) 10 mg tablet 3/30/2023  Yes Yes   Sig: Take 10 mg by mouth daily   fluticasone (FLONASE) 50 mcg/act nasal spray 3/30/2023  Yes Yes   Si sprays into each nostril daily   ibuprofen (MOTRIN) 600 mg tablet 3/30/2023  Yes Yes   Sig: Take 600 mg by mouth every 6 (six) hours as needed   lisinopril (ZESTRIL) 10 mg tablet 3/30/2023  Yes Yes   Sig: Take 10 mg by mouth daily   naproxen (NAPROSYN) 375 mg tablet 3/30/2023  Yes Yes   Sig: Take 375 mg by mouth 2 (two) times a day   polyethylene glycol (GLYCOLAX) 17 GM/SCOOP powder 3/30/2023  Yes Yes   Sig: Take 17 g by mouth   sertraline (ZOLOFT) 25 mg tablet 3/30/2023  Yes Yes   Sig: Take 25 mg by mouth daily      Facility-Administered Medications: None     No Known Allergies    Objective   First Vitals:   Blood Pressure: 150/98 (23)  Pulse: 69 (23)  Temperature: 97 7 °F (36 5 °C) (23)  Respirations: (!) 24 (23)  Height: 6' (182 9 cm) (23)  Weight - Scale: 104 kg (228 lb 6 3 oz) (23)  SpO2: 97 % (23)    Current Vitals:   Blood Pressure: 94/62 (23)  Pulse: 67 (23)  Temperature: 98 1 °F (36 7 °C) (23)  Respirations: 18 (23)  Height: 6' (182 9 cm) (23)  Weight - Scale: 104 kg (228 lb 6 3 oz) (23)  SpO2: 95 % (23 0720)      Intake/Output Summary (Last 24 hours) at 3/31/2023 0731  Last data filed at 3/31/2023 0101  Gross per 24 hour   Intake --   Output 150 ml   Net -150 ml       Invasive Devices     Peripheral Intravenous Line  Duration           Peripheral IV 03/30/23 Left Antecubital <1 day              Physical Exam:  General: VSS, NAD  Head normocephalic, atraumatic  Neck: supple, NT, no masses, JVD   Lungs CTA in all fields  Heart RRR with no murmur  Abd soft, no distension, hyperactiveBSx4, No tenderness to deep palpation, no masses or guarding  Extremities: FAROM with good strength equal bilaterally, no edema  Neuro: A&Ox3, affect appropriate, decreased ability to move left arm and left    Physical Exam    Lab Results:   CBC:   Lab Results   Component Value Date    WBC 10 86 (H) 03/31/2023    HGB 15 8 03/31/2023    HCT 49 6 (H) 03/31/2023    MCV 90 03/31/2023     03/31/2023    MCH 28 7 03/31/2023    MCHC 31 9 03/31/2023    RDW 13 5 03/31/2023    MPV 9 4 03/31/2023    NRBC 0 03/31/2023   , CMP:   Lab Results   Component Value Date    SODIUM 138 03/31/2023    K 4 7 03/31/2023     03/31/2023    CO2 23 03/31/2023    BUN 11 03/31/2023    CREATININE 0 86 03/31/2023    CALCIUM 9 1 03/31/2023    AST 9 (L) 03/30/2023    ALT 19 03/30/2023    ALKPHOS 125 (H) 03/30/2023    EGFR 95 03/31/2023   , Urinalysis: No results found for: COLORU, CLARITYU, SPECGRAV, PHUR, LEUKOCYTESUR, NITRITE, PROTEINUA, GLUCOSEU, KETONESU, BILIRUBINUR, BLOODU, Amylase: No results found for: AMYLASE, Lipase:   Lab Results   Component Value Date    LIPASE 18 03/30/2023     Imaging: I have personally reviewed pertinent reports         CT A/P 3/31/23:  ABDOMEN     LOWER CHEST:  No clinically significant abnormality identified in the visualized lower chest      LIVER/BILIARY TREE:  One or more subcentimeter sharply circumscribed low-density hepatic lesion(s) are noted, too small to accurately characterize, but statistically most likely to represent subcentimeter hepatic cysts  No suspicious solid hepatic   lesion is identified  Hepatic contours are normal   No biliary dilatation      GALLBLADDER:  No calcified gallstones  No pericholecystic inflammatory change      SPLEEN:  Unremarkable      PANCREAS:  Unremarkable      ADRENAL GLANDS:  Unremarkable      KIDNEYS/URETERS:  Unremarkable  No hydronephrosis      STOMACH AND BOWEL: There are multiple dilated loops of small bowel with transition point in the right lower quadrant (series 2, image 138) at a loop of bowel demonstrating moderate wall thickening (series 2, image 129)  There is mild mesenteric fat   stranding/edema  Colonic diverticulosis without evidence of diverticulitis      APPENDIX:  No findings to suggest appendicitis      ABDOMINOPELVIC CAVITY:  Trace ascites  No pneumoperitoneum  No lymphadenopathy      VESSELS:  Unremarkable for patient's age      PELVIS     REPRODUCTIVE ORGANS:  Unremarkable for patient's age      URINARY BLADDER:  Unremarkable      ABDOMINAL WALL/INGUINAL REGIONS:  Small bilateral fat-containing inguinal hernias  2 7 cm lipoma within the anterior left upper thigh musculature      OSSEOUS STRUCTURES:  No acute fracture or destructive osseous lesion  Spinal degenerative changes are noted  IMPRESSION:     Multiple dilated loops of small bowel with transition point in the right lower quadrant compatible with small bowel obstruction  Transition point is at a loop of small bowel demonstrating moderate wall thickening, underlying infectious or inflammatory   enteritis is not excluded      EKG, Pathology, and Other Studies: I have personally reviewed pertinent reports  Counseling / Coordination of Care  Total floor / unit time spent today 45 minutes  Greater than 50% of total time was spent with the patient and / or family counseling and / or coordination of care    A description of the counseling / coordination of care:  Review of patient's  Pmhx history, labs, imaging  Obtaining HPI, performing exam, discussing treatment plan with patient

## 2023-03-31 NOTE — PLAN OF CARE
Problem: PAIN - ADULT  Goal: Verbalizes/displays adequate comfort level or baseline comfort level  Description: Interventions:  - Encourage patient to monitor pain and request assistance  - Assess pain using appropriate pain scale  - Administer analgesics based on type and severity of pain and evaluate response  - Implement non-pharmacological measures as appropriate and evaluate response  - Consider cultural and social influences on pain and pain management  - Notify physician/advanced practitioner if interventions unsuccessful or patient reports new pain  Outcome: Progressing     Problem: INFECTION - ADULT  Goal: Absence or prevention of progression during hospitalization  Description: INTERVENTIONS:  - Assess and monitor for signs and symptoms of infection  - Monitor lab/diagnostic results  - Monitor all insertion sites, i e  indwelling lines, tubes, and drains  - Monitor endotracheal if appropriate and nasal secretions for changes in amount and color  - [place] appropriate cooling/warming therapies per order  - Administer medications as ordered  - Instruct and encourage patient and family to use good hand hygiene technique  - Identify and instruct in appropriate isolation precautions for identified infection/condition  Outcome: Progressing     Problem: SAFETY ADULT  Goal: Patient will remain free of falls  Description: INTERVENTIONS:  - Educate patient/family on patient safety including physical limitations  - Instruct patient to call for assistance with activity   - Consult OT/PT to assist with strengthening/mobility   - Keep Call bell within reach  - Keep bed low and locked with side rails adjusted as appropriate  - Keep care items and personal belongings within reach  - Initiate and maintain comfort rounds  - Make Fall Risk Sign visible to staff  - Offer Toileting every   Hours, in advance of need  - Initiate/Maintain   alarm  - Obtain necessary fall risk management equipment:     - Apply yellow socks and bracelet for high fall risk patients  - Consider moving patient to room near nurses station  Outcome: Progressing  Goal: Maintain or return to baseline ADL function  Description: INTERVENTIONS:  -  Assess patient's ability to carry out ADLs; assess patient's baseline for ADL function and identify physical deficits which impact ability to perform ADLs (bathing, care of mouth/teeth, toileting, grooming, dressing, etc )  - Assess/evaluate cause of self-care deficits   - Assess range of motion  - Assess patient's mobility; develop plan if impaired  - Assess patient's need for assistive devices and provide as appropriate  - Encourage maximum independence but intervene and supervise when necessary  - Involve family in performance of ADLs  - Assess for home care needs following discharge   - Consider OT consult to assist with ADL evaluation and planning for discharge  - Provide patient education as appropriate  Outcome: Progressing  Goal: Maintains/Returns to pre admission functional level  Description: INTERVENTIONS:  - Perform BMAT or MOVE assessment daily    - Set and communicate daily mobility goal to care team and patient/family/caregiver  - Collaborate with rehabilitation services on mobility goals if consulted  - Perform Range of Motion   times a day  - Reposition patient every   hours  - Dangle patient   times a day  - Stand patient   times a day  - Ambulate patient     times a day  - Out of bed to chair   times a day   - Out of bed for meals     times a day  - Out of bed for toileting  - Record patient progress and toleration of activity level   Outcome: Progressing     Problem: DISCHARGE PLANNING  Goal: Discharge to home or other facility with appropriate resources  Description: INTERVENTIONS:  - Identify barriers to discharge w/patient and caregiver  - Arrange for needed discharge resources and transportation as appropriate  - Identify discharge learning needs (meds, wound care, etc )  - Arrange for interpretive services to assist at discharge as needed  - Refer to Case Management Department for coordinating discharge planning if the patient needs post-hospital services based on physician/advanced practitioner order or complex needs related to functional status, cognitive ability, or social support system  Outcome: Progressing     Problem: Knowledge Deficit  Goal: Patient/family/caregiver demonstrates understanding of disease process, treatment plan, medications, and discharge instructions  Description: Complete learning assessment and assess knowledge base    Interventions:  - Provide teaching at level of understanding  - Provide teaching via preferred learning methods  Outcome: Progressing     Problem: Prexisting or High Potential for Compromised Skin Integrity  Goal: Skin integrity is maintained or improved  Description: INTERVENTIONS:  - Identify patients at risk for skin breakdown  - Assess and monitor skin integrity  - Assess and monitor nutrition and hydration status  - Monitor labs   - Assess for incontinence   - Turn and reposition patient  - Assist with mobility/ambulation  - Relieve pressure over bony prominences  - Avoid friction and shearing  - Provide appropriate hygiene as needed including keeping skin clean and dry  - Evaluate need for skin moisturizer/barrier cream  - Collaborate with interdisciplinary team   - Patient/family teaching  - Consider wound care consult   Outcome: Progressing     Problem: MOBILITY - ADULT  Goal: Maintain or return to baseline ADL function  Description: INTERVENTIONS:  -  Assess patient's ability to carry out ADLs; assess patient's baseline for ADL function and identify physical deficits which impact ability to perform ADLs (bathing, care of mouth/teeth, toileting, grooming, dressing, etc )  - Assess/evaluate cause of self-care deficits   - Assess range of motion  - Assess patient's mobility; develop plan if impaired  - Assess patient's need for assistive devices and provide as appropriate  - Encourage maximum independence but intervene and supervise when necessary  - Involve family in performance of ADLs  - Assess for home care needs following discharge   - Consider OT consult to assist with ADL evaluation and planning for discharge  - Provide patient education as appropriate  Outcome: Progressing  Goal: Maintains/Returns to pre admission functional level  Description: INTERVENTIONS:  - Perform BMAT or MOVE assessment daily    - Set and communicate daily mobility goal to care team and patient/family/caregiver  - Collaborate with rehabilitation services on mobility goals if consulted  - Perform Range of Motion     times a day  - Reposition patient every   hours  - Dangle patient   times a day  - Stand patient   times a day  - Ambulate patient   times a day  - Out of bed to chair   times a day   - Out of bed for meals    times a day  - Out of bed for toileting  - Record patient progress and toleration of activity level   Outcome: Progressing

## 2023-04-01 LAB
ALBUMIN SERPL BCP-MCNC: 3.6 G/DL (ref 3.5–5)
ALP SERPL-CCNC: 97 U/L (ref 34–104)
ALT SERPL W P-5'-P-CCNC: 15 U/L (ref 7–52)
ANION GAP SERPL CALCULATED.3IONS-SCNC: 6 MMOL/L (ref 4–13)
AST SERPL W P-5'-P-CCNC: 9 U/L (ref 13–39)
BASOPHILS # BLD AUTO: 0.03 THOUSANDS/ÂΜL (ref 0–0.1)
BASOPHILS NFR BLD AUTO: 0 % (ref 0–1)
BILIRUB SERPL-MCNC: 0.51 MG/DL (ref 0.2–1)
BUN SERPL-MCNC: 10 MG/DL (ref 5–25)
CALCIUM SERPL-MCNC: 8.6 MG/DL (ref 8.4–10.2)
CHLORIDE SERPL-SCNC: 108 MMOL/L (ref 96–108)
CO2 SERPL-SCNC: 28 MMOL/L (ref 21–32)
CREAT SERPL-MCNC: 0.82 MG/DL (ref 0.6–1.3)
EOSINOPHIL # BLD AUTO: 0.41 THOUSAND/ÂΜL (ref 0–0.61)
EOSINOPHIL NFR BLD AUTO: 6 % (ref 0–6)
ERYTHROCYTE [DISTWIDTH] IN BLOOD BY AUTOMATED COUNT: 13.2 % (ref 11.6–15.1)
GFR SERPL CREATININE-BSD FRML MDRD: 97 ML/MIN/1.73SQ M
GLUCOSE SERPL-MCNC: 77 MG/DL (ref 65–140)
HCT VFR BLD AUTO: 46.2 % (ref 36.5–49.3)
HGB BLD-MCNC: 14.9 G/DL (ref 12–17)
IMM GRANULOCYTES # BLD AUTO: 0.01 THOUSAND/UL (ref 0–0.2)
IMM GRANULOCYTES NFR BLD AUTO: 0 % (ref 0–2)
LYMPHOCYTES # BLD AUTO: 2.08 THOUSANDS/ÂΜL (ref 0.6–4.47)
LYMPHOCYTES NFR BLD AUTO: 28 % (ref 14–44)
MCH RBC QN AUTO: 28.8 PG (ref 26.8–34.3)
MCHC RBC AUTO-ENTMCNC: 32.3 G/DL (ref 31.4–37.4)
MCV RBC AUTO: 89 FL (ref 82–98)
MONOCYTES # BLD AUTO: 0.46 THOUSAND/ÂΜL (ref 0.17–1.22)
MONOCYTES NFR BLD AUTO: 6 % (ref 4–12)
NEUTROPHILS # BLD AUTO: 4.48 THOUSANDS/ÂΜL (ref 1.85–7.62)
NEUTS SEG NFR BLD AUTO: 60 % (ref 43–75)
NRBC BLD AUTO-RTO: 0 /100 WBCS
PLATELET # BLD AUTO: 292 THOUSANDS/UL (ref 149–390)
PMV BLD AUTO: 9.6 FL (ref 8.9–12.7)
POTASSIUM SERPL-SCNC: 3.9 MMOL/L (ref 3.5–5.3)
PROT SERPL-MCNC: 5.7 G/DL (ref 6.4–8.4)
RBC # BLD AUTO: 5.17 MILLION/UL (ref 3.88–5.62)
SODIUM SERPL-SCNC: 142 MMOL/L (ref 135–147)
WBC # BLD AUTO: 7.47 THOUSAND/UL (ref 4.31–10.16)

## 2023-04-01 RX ORDER — LISINOPRIL 10 MG/1
10 TABLET ORAL DAILY
Status: DISCONTINUED | OUTPATIENT
Start: 2023-04-01 | End: 2023-04-02

## 2023-04-01 RX ORDER — ASPIRIN 81 MG/1
81 TABLET ORAL DAILY
Status: DISCONTINUED | OUTPATIENT
Start: 2023-04-01 | End: 2023-04-02 | Stop reason: HOSPADM

## 2023-04-01 RX ORDER — SERTRALINE HYDROCHLORIDE 25 MG/1
25 TABLET, FILM COATED ORAL DAILY
Status: DISCONTINUED | OUTPATIENT
Start: 2023-04-01 | End: 2023-04-02 | Stop reason: HOSPADM

## 2023-04-01 RX ORDER — ATORVASTATIN CALCIUM 10 MG/1
10 TABLET, FILM COATED ORAL
Status: DISCONTINUED | OUTPATIENT
Start: 2023-04-01 | End: 2023-04-02 | Stop reason: HOSPADM

## 2023-04-01 RX ORDER — EZETIMIBE 10 MG/1
10 TABLET ORAL
Status: DISCONTINUED | OUTPATIENT
Start: 2023-04-01 | End: 2023-04-02 | Stop reason: HOSPADM

## 2023-04-01 RX ADMIN — LISINOPRIL 10 MG: 10 TABLET ORAL at 11:15

## 2023-04-01 RX ADMIN — HEPARIN SODIUM 5000 UNITS: 5000 INJECTION INTRAVENOUS; SUBCUTANEOUS at 20:34

## 2023-04-01 RX ADMIN — EZETIMIBE 10 MG: 10 TABLET ORAL at 20:33

## 2023-04-01 RX ADMIN — HEPARIN SODIUM 5000 UNITS: 5000 INJECTION INTRAVENOUS; SUBCUTANEOUS at 05:22

## 2023-04-01 RX ADMIN — SERTRALINE 25 MG: 25 TABLET, FILM COATED ORAL at 11:15

## 2023-04-01 RX ADMIN — PANTOPRAZOLE SODIUM 40 MG: 40 INJECTION, POWDER, FOR SOLUTION INTRAVENOUS at 08:34

## 2023-04-01 RX ADMIN — ASPIRIN 81 MG: 81 TABLET, COATED ORAL at 11:15

## 2023-04-01 NOTE — ASSESSMENT & PLAN NOTE
· POA with severe abdominal pain without vomiting  · CT abdomen pelvis with SBO, transition point right lower quadrant  · Hx SBO x2 in 2021 hospitalized at [facility] resolved with conservative measures  · No history abdominal surgery  · DVT prophylaxis enoxaparin, SCDs  · GI prophylaxis Protonix  · General surgery consulted  · Advance diet surgical soft- d/c fluids  · Continue conservative management, condition improving clinical exam benign

## 2023-04-01 NOTE — ASSESSMENT & PLAN NOTE
· History CAD status post angioplasty RCA  · Resume aspirin, high intensity statin   · Denies chest pain  · Tobacco cessation discussed with patient

## 2023-04-01 NOTE — PLAN OF CARE
Problem: PAIN - ADULT  Goal: Verbalizes/displays adequate comfort level or baseline comfort level  Description: Interventions:  - Encourage patient to monitor pain and request assistance  - Assess pain using appropriate pain scale  - Administer analgesics based on type and severity of pain and evaluate response  - Implement non-pharmacological measures as appropriate and evaluate response  - Consider cultural and social influences on pain and pain management  - Notify physician/advanced practitioner if interventions unsuccessful or patient reports new pain  Outcome: Progressing     Problem: INFECTION - ADULT  Goal: Absence or prevention of progression during hospitalization  Description: INTERVENTIONS:  - Assess and monitor for signs and symptoms of infection  - Monitor lab/diagnostic results  - Monitor all insertion sites, i e  indwelling lines, tubes, and drains  - Monitor endotracheal if appropriate and nasal secretions for changes in amount and color  - [place] appropriate cooling/warming therapies per order  - Administer medications as ordered  - Instruct and encourage patient and family to use good hand hygiene technique  - Identify and instruct in appropriate isolation precautions for identified infection/condition  Outcome: Progressing     Problem: SAFETY ADULT  Goal: Patient will remain free of falls  Description: INTERVENTIONS:  - Educate patient/family on patient safety including physical limitations  - Instruct patient to call for assistance with activity   - Consult OT/PT to assist with strengthening/mobility   - Keep Call bell within reach  - Keep bed low and locked with side rails adjusted as appropriate  - Keep care items and personal belongings within reach  - Initiate and maintain comfort rounds  - Make Fall Risk Sign visible to staff  - Offer Toileting every 2 Hours, in advance of need  - Initiate/Maintain bed/chair alarm  - Obtain necessary fall risk management equipment:   - Apply yellow socks and bracelet for high fall risk patients  - Consider moving patient to room near nurses station  Outcome: Progressing  Goal: Maintain or return to baseline ADL function  Description: INTERVENTIONS:  -  Assess patient's ability to carry out ADLs; assess patient's baseline for ADL function and identify physical deficits which impact ability to perform ADLs (bathing, care of mouth/teeth, toileting, grooming, dressing, etc )  - Assess/evaluate cause of self-care deficits   - Assess range of motion  - Assess patient's mobility; develop plan if impaired  - Assess patient's need for assistive devices and provide as appropriate  - Encourage maximum independence but intervene and supervise when necessary  - Involve family in performance of ADLs  - Assess for home care needs following discharge   - Consider OT consult to assist with ADL evaluation and planning for discharge  - Provide patient education as appropriate  Outcome: Progressing  Goal: Maintains/Returns to pre admission functional level  Description: INTERVENTIONS:  - Perform BMAT or MOVE assessment daily    - Set and communicate daily mobility goal to care team and patient/family/caregiver  - Collaborate with rehabilitation services on mobility goals if consulted  - Perform Range of Motion 2 times a day  - Reposition patient every 2 hours    - Dangle patient 2 times a day  - Stand patient 2 times a day  - Ambulate patient 2 times a day  - Out of bed to chair 2 times a day   - Out of bed for meals 2 times a day  - Out of bed for toileting  - Record patient progress and toleration of activity level   Outcome: Progressing     Problem: DISCHARGE PLANNING  Goal: Discharge to home or other facility with appropriate resources  Description: INTERVENTIONS:  - Identify barriers to discharge w/patient and caregiver  - Arrange for needed discharge resources and transportation as appropriate  - Identify discharge learning needs (meds, wound care, etc )  - Arrange for interpretive services to assist at discharge as needed  - Refer to Case Management Department for coordinating discharge planning if the patient needs post-hospital services based on physician/advanced practitioner order or complex needs related to functional status, cognitive ability, or social support system  Outcome: Progressing     Problem: Knowledge Deficit  Goal: Patient/family/caregiver demonstrates understanding of disease process, treatment plan, medications, and discharge instructions  Description: Complete learning assessment and assess knowledge base    Interventions:  - Provide teaching at level of understanding  - Provide teaching via preferred learning methods  Outcome: Progressing     Problem: Prexisting or High Potential for Compromised Skin Integrity  Goal: Skin integrity is maintained or improved  Description: INTERVENTIONS:  - Identify patients at risk for skin breakdown  - Assess and monitor skin integrity  - Assess and monitor nutrition and hydration status  - Monitor labs   - Assess for incontinence   - Turn and reposition patient  - Assist with mobility/ambulation  - Relieve pressure over bony prominences  - Avoid friction and shearing  - Provide appropriate hygiene as needed including keeping skin clean and dry  - Evaluate need for skin moisturizer/barrier cream  - Collaborate with interdisciplinary team   - Patient/family teaching  - Consider wound care consult   Outcome: Progressing     Problem: MOBILITY - ADULT  Goal: Maintain or return to baseline ADL function  Description: INTERVENTIONS:  -  Assess patient's ability to carry out ADLs; assess patient's baseline for ADL function and identify physical deficits which impact ability to perform ADLs (bathing, care of mouth/teeth, toileting, grooming, dressing, etc )  - Assess/evaluate cause of self-care deficits   - Assess range of motion  - Assess patient's mobility; develop plan if impaired  - Assess patient's need for assistive devices and provide as appropriate  - Encourage maximum independence but intervene and supervise when necessary  - Involve family in performance of ADLs  - Assess for home care needs following discharge   - Consider OT consult to assist with ADL evaluation and planning for discharge  - Provide patient education as appropriate  Outcome: Progressing  Goal: Maintains/Returns to pre admission functional level  Description: INTERVENTIONS:  - Perform BMAT or MOVE assessment daily    - Set and communicate daily mobility goal to care team and patient/family/caregiver  - Collaborate with rehabilitation services on mobility goals if consulted  - Perform Range of Motion 2 times a day  - Reposition patient every 2 hours    - Dangle patient 2 times a day  - Stand patient 2 times a day  - Ambulate patient 2 times a day  - Out of bed to chair 2 times a day   - Out of bed for meals 2 times a day  - Out of bed for toileting  - Record patient progress and toleration of activity level   Outcome: Progressing

## 2023-04-01 NOTE — PLAN OF CARE
Problem: PAIN - ADULT  Goal: Verbalizes/displays adequate comfort level or baseline comfort level  Description: Interventions:  - Encourage patient to monitor pain and request assistance  - Assess pain using appropriate pain scale  - Administer analgesics based on type and severity of pain and evaluate response  - Implement non-pharmacological measures as appropriate and evaluate response  - Consider cultural and social influences on pain and pain management  - Notify physician/advanced practitioner if interventions unsuccessful or patient reports new pain  Outcome: Progressing     Problem: INFECTION - ADULT  Goal: Absence or prevention of progression during hospitalization  Description: INTERVENTIONS:  - Assess and monitor for signs and symptoms of infection  - Monitor lab/diagnostic results  - Monitor all insertion sites, i e  indwelling lines, tubes, and drains  - Monitor endotracheal if appropriate and nasal secretions for changes in amount and color  - [place] appropriate cooling/warming therapies per order  - Administer medications as ordered  - Instruct and encourage patient and family to use good hand hygiene technique  - Identify and instruct in appropriate isolation precautions for identified infection/condition  Outcome: Progressing     Problem: SAFETY ADULT  Goal: Patient will remain free of falls  Description: INTERVENTIONS:  - Educate patient/family on patient safety including physical limitations  - Instruct patient to call for assistance with activity   - Consult OT/PT to assist with strengthening/mobility   - Keep Call bell within reach  - Keep bed low and locked with side rails adjusted as appropriate  - Keep care items and personal belongings within reach  - Initiate and maintain comfort rounds  - Make Fall Risk Sign visible to staff  - Offer Toileting every 2 Hours, in advance of need  - Initiate/Maintain bed/chair alarm  - Obtain necessary fall risk management equipment: alarm  - Apply yellow socks and bracelet for high fall risk patients  - Consider moving patient to room near nurses station  Outcome: Progressing  Goal: Maintain or return to baseline ADL function  Description: INTERVENTIONS:  -  Assess patient's ability to carry out ADLs; assess patient's baseline for ADL function and identify physical deficits which impact ability to perform ADLs (bathing, care of mouth/teeth, toileting, grooming, dressing, etc )  - Assess/evaluate cause of self-care deficits   - Assess range of motion  - Assess patient's mobility; develop plan if impaired  - Assess patient's need for assistive devices and provide as appropriate  - Encourage maximum independence but intervene and supervise when necessary  - Involve family in performance of ADLs  - Assess for home care needs following discharge   - Consider OT consult to assist with ADL evaluation and planning for discharge  - Provide patient education as appropriate  Outcome: Progressing  Goal: Maintains/Returns to pre admission functional level  Description: INTERVENTIONS:  - Perform BMAT or MOVE assessment daily    - Set and communicate daily mobility goal to care team and patient/family/caregiver  - Collaborate with rehabilitation services on mobility goals if consulted  - Perform Range of Motion 2 times a day  - Reposition patient every 2 hours    - Dangle patient 2 times a day  - Stand patient 2 times a day  - Ambulate patient 2 times a day  - Out of bed to chair 2 times a day   - Out of bed for meals 2 times a day  - Out of bed for toileting  - Record patient progress and toleration of activity level   Outcome: Progressing     Problem: DISCHARGE PLANNING  Goal: Discharge to home or other facility with appropriate resources  Description: INTERVENTIONS:  - Identify barriers to discharge w/patient and caregiver  - Arrange for needed discharge resources and transportation as appropriate  - Identify discharge learning needs (meds, wound care, etc )  - Arrange for interpretive services to assist at discharge as needed  - Refer to Case Management Department for coordinating discharge planning if the patient needs post-hospital services based on physician/advanced practitioner order or complex needs related to functional status, cognitive ability, or social support system  Outcome: Progressing     Problem: Knowledge Deficit  Goal: Patient/family/caregiver demonstrates understanding of disease process, treatment plan, medications, and discharge instructions  Description: Complete learning assessment and assess knowledge base    Interventions:  - Provide teaching at level of understanding  - Provide teaching via preferred learning methods  Outcome: Progressing     Problem: Prexisting or High Potential for Compromised Skin Integrity  Goal: Skin integrity is maintained or improved  Description: INTERVENTIONS:  - Identify patients at risk for skin breakdown  - Assess and monitor skin integrity  - Assess and monitor nutrition and hydration status  - Monitor labs   - Assess for incontinence   - Turn and reposition patient  - Assist with mobility/ambulation  - Relieve pressure over bony prominences  - Avoid friction and shearing  - Provide appropriate hygiene as needed including keeping skin clean and dry  - Evaluate need for skin moisturizer/barrier cream  - Collaborate with interdisciplinary team   - Patient/family teaching  - Consider wound care consult   Outcome: Progressing     Problem: MOBILITY - ADULT  Goal: Maintain or return to baseline ADL function  Description: INTERVENTIONS:  -  Assess patient's ability to carry out ADLs; assess patient's baseline for ADL function and identify physical deficits which impact ability to perform ADLs (bathing, care of mouth/teeth, toileting, grooming, dressing, etc )  - Assess/evaluate cause of self-care deficits   - Assess range of motion  - Assess patient's mobility; develop plan if impaired  - Assess patient's need for assistive devices and provide as appropriate  - Encourage maximum independence but intervene and supervise when necessary  - Involve family in performance of ADLs  - Assess for home care needs following discharge   - Consider OT consult to assist with ADL evaluation and planning for discharge  - Provide patient education as appropriate  Outcome: Progressing  Goal: Maintains/Returns to pre admission functional level  Description: INTERVENTIONS:  - Perform BMAT or MOVE assessment daily    - Set and communicate daily mobility goal to care team and patient/family/caregiver  - Collaborate with rehabilitation services on mobility goals if consulted  - Perform Range of Motion 2 times a day  - Reposition patient every 2 hours    - Dangle patient 2 times a day  - Stand patient 2 times a day  - Ambulate patient 2 times a day  - Out of bed to chair 2 times a day   - Out of bed for meals 2 times a day  - Out of bed for toileting  - Record patient progress and toleration of activity level   Outcome: Progressing

## 2023-04-01 NOTE — PROGRESS NOTES
"Progress Note - General Surgery   Kj Vallejo 62 y o  male MRN: [id number]  Unit/Bed#: -01 Encounter: [id number]    Assessment:  61 yo M with pSBO  AVSS, WBC 7 (10)    UOP: 2L    Plan:  - cont conservative management of SBO  No indication for surgical intervention at this time  - advance to surgical soft diet  - encourage oob to chair at least  Pt states he is able to do this on his own at home normally  - can dc IVF once tolerating adequate PO  - dvt ppx    Rest of care per primary    Subjective/Objective     Subjective: feels well today, denies any abdominal pain  Tolerating CLD without nausea or vomiting  Feels hungry, asking for \"real food\"  +flatus, no BM yet per pt    Objective:     Blood pressure 121/71, pulse 75, temperature 97 9 °F (36 6 °C), resp  rate 19, height 6' (1 829 m), weight 104 kg (228 lb 6 3 oz), SpO2 93 %  ,Body mass index is 30 98 kg/m²  Intake/Output Summary (Last 24 hours) at 4/1/2023 1100  Last data filed at 4/1/2023 0741  Gross per 24 hour   Intake 1588 75 ml   Output 1900 ml   Net -311 25 ml       Invasive Devices     Peripheral Intravenous Line  Duration           Peripheral IV 03/30/23 Left Antecubital 1 day                Physical Exam  Vitals and nursing note reviewed  Constitutional:       General: He is not in acute distress  Appearance: Normal appearance  He is normal weight  He is not ill-appearing, toxic-appearing or diaphoretic  HENT:      Head: Normocephalic and atraumatic  Eyes:      Extraocular Movements: Extraocular movements intact  Cardiovascular:      Rate and Rhythm: Normal rate  Pulmonary:      Effort: Pulmonary effort is normal  No respiratory distress  Abdominal:      General: There is distension  Palpations: Abdomen is soft  Tenderness: There is no abdominal tenderness  There is no guarding or rebound  Comments: abd soft, minimally distended   No abdominal tenderness to light or deep palpation   Skin:     General: Skin is " warm       Capillary Refill: Capillary refill takes less than 2 seconds  Neurological:      General: No focal deficit present  Mental Status: He is alert and oriented to person, place, and time  Psychiatric:         Mood and Affect: Mood normal          Behavior: Behavior normal             Scheduled Meds:  Current Facility-Administered Medications   Medication Dose Route Frequency Provider Last Rate   • albuterol  2 puff Inhalation Q4H PRN Yumiko S Maura, CRNP     • heparin (porcine)  5,000 Units Subcutaneous Q8H [address] Hunter Pineda MD     • HYDROmorphone  0 5 mg Intravenous Q3H PRN Yumiko S Maura, CRNP     • LORazepam  0 5 mg Intravenous Q6H PRN Yumiko S Maura, CRNP     • morphine injection  4 mg Intravenous Q3H PRN Yumiko S Maura, CRNP     • multi-electrolyte  75 mL/hr Intravenous Continuous Yumiko S Maura, CRNP 75 mL/hr (03/31/23 2120)   • ondansetron  4 mg Intravenous Q6H PRN Yumiko S Maura, CRNP     • pantoprazole  40 mg Intravenous Q24H [address] Yumiko S Maura, CRNP       Continuous Infusions:multi-electrolyte, 75 mL/hr, Last Rate: 75 mL/hr (03/31/23 2120)      PRN Meds: •  albuterol  •  HYDROmorphone  •  LORazepam  •  morphine injection  •  ondansetron      Lab, Imaging and other studies:  I have personally reviewed pertinent lab results    , CBC:   Lab Results   Component Value Date    WBC 7 47 04/01/2023    HGB 14 9 04/01/2023    HCT 46 2 04/01/2023    MCV 89 04/01/2023     04/01/2023    MCH 28 8 04/01/2023    MCHC 32 3 04/01/2023    RDW 13 2 04/01/2023    MPV 9 6 04/01/2023    NRBC 0 04/01/2023   , CMP:   Lab Results   Component Value Date    SODIUM 142 04/01/2023    K 3 9 04/01/2023     04/01/2023    CO2 28 04/01/2023    BUN 10 04/01/2023    CREATININE 0 82 04/01/2023    CALCIUM 8 6 04/01/2023    AST 9 (L) 04/01/2023    ALT 15 04/01/2023    ALKPHOS 97 04/01/2023    EGFR 97 04/01/2023   , Coagulation: No results found for: PT, INR, APTT, Urinalysis: No results found for: Jesus Manuel Mix, SPECGRAV, PHUR, LEUKOCYTESUR, NITRITE, PROTEINUA, GLUCOSEU, KETONESU, BILIRUBINUR, BLOODU  VTE Pharmacologic Prophylaxis: Heparin  VTE Mechanical Prophylaxis: sequential compression device      Unique Cantrell PA-C  4/1/2023 11:00 AM

## 2023-04-01 NOTE — ASSESSMENT & PLAN NOTE
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

## 2023-04-01 NOTE — PROGRESS NOTES
[address]  Progress Note  Name: Stanton Covington  MRN: [id number]  Unit/Bed#: -11 I Date of Admission: 3/30/2023   Date of Service: 4/1/2023 I Hospital Day: 2    Assessment/Plan   * SBO (small bowel obstruction) (HCC)  Assessment & Plan  · POA with severe abdominal pain without vomiting  · CT abdomen pelvis with SBO, transition point right lower quadrant  · Hx SBO x2 in 2021 hospitalized at [facility] resolved with conservative measures  · No history abdominal surgery  · DVT prophylaxis enoxaparin, SCDs  · GI prophylaxis Protonix  · General surgery consulted  · Advance diet surgical soft- d/c fluids  · Continue conservative management, condition improving clinical exam benign  Coronary artery disease involving native coronary artery of native heart  Assessment & Plan  · History CAD status post angioplasty RCA  · Resume aspirin, high intensity statin   · Denies chest pain  · Tobacco cessation discussed with patient    CVA, old, hemiparesis ([address] )  Assessment & Plan  · 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  · Left-sided flaccid hemiparesis, nonambulatory utilizes wheelchair  · Resume aspirin, high intensity statin    Tobacco abuse  Assessment & Plan  · Reports 1/2 pack/day tobacco abuse  · Declines nicotine replacement therapy  · Cessation counseling provided           VTE Pharmacologic Prophylaxis: VTE Score: 4 Moderate Risk (Score 3-4) - Pharmacological DVT Prophylaxis Ordered: enoxaparin (Lovenox)  Patient Centered Rounds: I performed bedside rounds with nursing staff today  Discussions with Specialists or Other Care Team Provider: surgery    Education and Discussions with Family / Patient: Patient declined call to        Total Time Spent on Date of Encounter in care of patient: 35 minutes This time was spent on one or more of the following: performing physical exam; counseling and coordination of care; obtaining or reviewing history; documenting in the medical record; reviewing/ordering tests, medications or procedures; communicating with other healthcare professionals and discussing with patient's family/caregivers  Current Length of Stay: 2 day(s)  Current Patient Status: Inpatient   Certification Statement: The patient will continue to require additional inpatient hospital stay due to Small bowel obstruction advancing diet  Discharge Plan: Anticipate discharge tomorrow to home  Code Status: Level 1 - Full Code    Subjective:   Resting comfortably in bed denies any nausea, vomiting  Tolerating clears per surgery advance to soft diet  Is passing gas, no bowel movements    Objective:     Vitals:   Temp (24hrs), Av 9 °F (36 6 °C), Min:97 7 °F (36 5 °C), Max:98 1 °F (36 7 °C)    Temp:  [97 7 °F (36 5 °C)-98 1 °F (36 7 °C)] 97 9 °F (36 6 °C)  HR:  [75] 75  Resp:  [16-19] 19  BP: (111-122)/(68-85) 121/71  SpO2:  [93 %] 93 %  Body mass index is 30 98 kg/m²  Input and Output Summary (last 24 hours): Intake/Output Summary (Last 24 hours) at 2023 1113  Last data filed at 2023 0741  Gross per 24 hour   Intake 1588 75 ml   Output 1900 ml   Net -311 25 ml       Physical Exam:   Physical Exam  Vitals and nursing note reviewed  Constitutional:       General: He is not in acute distress  Appearance: He is well-developed  HENT:      Head: Normocephalic and atraumatic  Mouth/Throat:      Mouth: Mucous membranes are dry  Eyes:      Conjunctiva/sclera: Conjunctivae normal       Pupils: Pupils are equal, round, and reactive to light  Cardiovascular:      Rate and Rhythm: Normal rate and regular rhythm  Pulses: Normal pulses  Heart sounds: No murmur heard  Pulmonary:      Effort: Pulmonary effort is normal  No respiratory distress  Breath sounds: Normal breath sounds  No wheezing  Abdominal:      General: Bowel sounds are decreased   There is distension (soft)  Palpations: Abdomen is soft  Tenderness: There is no abdominal tenderness  There is no guarding  Musculoskeletal:         General: No swelling  Cervical back: Neck supple  Right lower leg: No edema  Left lower leg: No edema  Skin:     General: Skin is warm and dry  Capillary Refill: Capillary refill takes less than 2 seconds  Neurological:      General: No focal deficit present  Mental Status: He is alert     Psychiatric:         Mood and Affect: Mood normal          Additional Data:     Labs:  Results from last 7 days   Lab Units 04/01/23  0508   WBC Thousand/uL 7 47   HEMOGLOBIN g/dL 14 9   HEMATOCRIT % 46 2   PLATELETS Thousands/uL 292   NEUTROS PCT % 60   LYMPHS PCT % 28   MONOS PCT % 6   EOS PCT % 6     Results from last 7 days   Lab Units 04/01/23  0508   SODIUM mmol/L 142   POTASSIUM mmol/L 3 9   CHLORIDE mmol/L 108   CO2 mmol/L 28   BUN mg/dL 10   CREATININE mg/dL 0 82   ANION GAP mmol/L 6   CALCIUM mg/dL 8 6   ALBUMIN g/dL 3 6   TOTAL BILIRUBIN mg/dL 0 51   ALK PHOS U/L 97   ALT U/L 15   AST U/L 9*   GLUCOSE RANDOM mg/dL 77                       Lines/Drains:  Invasive Devices     Peripheral Intravenous Line  Duration           Peripheral IV 03/30/23 Left Antecubital 1 day                      Imaging: Reviewed radiology reports from this admission including: abdominal/pelvic CT    Recent Cultures (last 7 days):         Last 24 Hours Medication List:   Current Facility-Administered Medications   Medication Dose Route Frequency Provider Last Rate   • albuterol  2 puff Inhalation Q4H PRN SARAH Hernandes     • aspirin  81 mg Oral Daily SARAH Cruz     • atorvastatin  10 mg Oral Daily With [employer] of SARAH Mullen     • ezetimibe  10 mg Oral HS SARAH Cruz     • heparin (porcine)  5,000 Units Subcutaneous Q8H [address] Hunter Pineda MD     • HYDROmorphone  0 5 mg Intravenous Q3H PRN SARAH Hernandes     • lisinopril  10 mg Oral Daily SARAH Pérez     • LORazepam  0 5 mg Intravenous Q6H PRN Yumiko S Maura, CRNP     • morphine injection  4 mg Intravenous Q3H PRN Yumiko S Maura, CRNP     • ondansetron  4 mg Intravenous Q6H PRN Yumiko S Maura, CRNP     • pantoprazole  40 mg Intravenous Q24H [address] Yumiko S Maura, CRNP     • sertraline  25 mg Oral Daily SARAH Pérez          Today, Patient Was Seen By: SARAH Pérez    **Please Note: This note may have been constructed using a voice recognition system  **

## 2023-04-02 VITALS
DIASTOLIC BLOOD PRESSURE: 71 MMHG | OXYGEN SATURATION: 93 % | BODY MASS INDEX: 30.94 KG/M2 | WEIGHT: 228.4 LBS | RESPIRATION RATE: 19 BRPM | SYSTOLIC BLOOD PRESSURE: 94 MMHG | TEMPERATURE: 98.1 F | HEIGHT: 72 IN | HEART RATE: 75 BPM

## 2023-04-02 LAB
ANION GAP SERPL CALCULATED.3IONS-SCNC: 7 MMOL/L (ref 4–13)
BUN SERPL-MCNC: 13 MG/DL (ref 5–25)
CALCIUM SERPL-MCNC: 8.8 MG/DL (ref 8.4–10.2)
CHLORIDE SERPL-SCNC: 109 MMOL/L (ref 96–108)
CO2 SERPL-SCNC: 25 MMOL/L (ref 21–32)
CREAT SERPL-MCNC: 0.91 MG/DL (ref 0.6–1.3)
ERYTHROCYTE [DISTWIDTH] IN BLOOD BY AUTOMATED COUNT: 13.2 % (ref 11.6–15.1)
GFR SERPL CREATININE-BSD FRML MDRD: 92 ML/MIN/1.73SQ M
GLUCOSE SERPL-MCNC: 85 MG/DL (ref 65–140)
HCT VFR BLD AUTO: 47.4 % (ref 36.5–49.3)
HGB BLD-MCNC: 15.5 G/DL (ref 12–17)
MCH RBC QN AUTO: 29.1 PG (ref 26.8–34.3)
MCHC RBC AUTO-ENTMCNC: 32.7 G/DL (ref 31.4–37.4)
MCV RBC AUTO: 89 FL (ref 82–98)
PLATELET # BLD AUTO: 288 THOUSANDS/UL (ref 149–390)
PMV BLD AUTO: 9.3 FL (ref 8.9–12.7)
POTASSIUM SERPL-SCNC: 4 MMOL/L (ref 3.5–5.3)
RBC # BLD AUTO: 5.33 MILLION/UL (ref 3.88–5.62)
SODIUM SERPL-SCNC: 141 MMOL/L (ref 135–147)
WBC # BLD AUTO: 7.36 THOUSAND/UL (ref 4.31–10.16)

## 2023-04-02 RX ORDER — LISINOPRIL 10 MG/1
5 TABLET ORAL DAILY
Qty: 15 TABLET | Refills: 0 | Status: SHIPPED | OUTPATIENT
Start: 2023-04-02 | End: 2023-05-02

## 2023-04-02 RX ADMIN — HEPARIN SODIUM 5000 UNITS: 5000 INJECTION INTRAVENOUS; SUBCUTANEOUS at 06:00

## 2023-04-02 RX ADMIN — SERTRALINE 25 MG: 25 TABLET, FILM COATED ORAL at 08:08

## 2023-04-02 RX ADMIN — ASPIRIN 81 MG: 81 TABLET, COATED ORAL at 08:08

## 2023-04-02 RX ADMIN — PANTOPRAZOLE SODIUM 40 MG: 40 INJECTION, POWDER, FOR SOLUTION INTRAVENOUS at 08:08

## 2023-04-02 NOTE — PLAN OF CARE
Problem: PAIN - ADULT  Goal: Verbalizes/displays adequate comfort level or baseline comfort level  Description: Interventions:  - Encourage patient to monitor pain and request assistance  - Assess pain using appropriate pain scale  - Administer analgesics based on type and severity of pain and evaluate response  - Implement non-pharmacological measures as appropriate and evaluate response  - Consider cultural and social influences on pain and pain management  - Notify physician/advanced practitioner if interventions unsuccessful or patient reports new pain  Outcome: Progressing     Problem: INFECTION - ADULT  Goal: Absence or prevention of progression during hospitalization  Description: INTERVENTIONS:  - Assess and monitor for signs and symptoms of infection  - Monitor lab/diagnostic results  - Monitor all insertion sites, i e  indwelling lines, tubes, and drains  - Monitor endotracheal if appropriate and nasal secretions for changes in amount and color  - [place] appropriate cooling/warming therapies per order  - Administer medications as ordered  - Instruct and encourage patient and family to use good hand hygiene technique  - Identify and instruct in appropriate isolation precautions for identified infection/condition  Outcome: Progressing     Problem: SAFETY ADULT  Goal: Patient will remain free of falls  Description: INTERVENTIONS:  - Educate patient/family on patient safety including physical limitations  - Instruct patient to call for assistance with activity   - Consult OT/PT to assist with strengthening/mobility   - Keep Call bell within reach  - Keep bed low and locked with side rails adjusted as appropriate  - Keep care items and personal belongings within reach  - Initiate and maintain comfort rounds  - Make Fall Risk Sign visible to staff  - Offer Toileting every 2 Hours, in advance of need  - Initiate/Maintain bed alarm  - Obtain necessary fall risk management equipment: yellow socks, yellow bracelet, alarm  - Apply yellow socks and bracelet for high fall risk patients  - Consider moving patient to room near nurses station  Outcome: Progressing  Goal: Maintain or return to baseline ADL function  Description: INTERVENTIONS:  -  Assess patient's ability to carry out ADLs; assess patient's baseline for ADL function and identify physical deficits which impact ability to perform ADLs (bathing, care of mouth/teeth, toileting, grooming, dressing, etc )  - Assess/evaluate cause of self-care deficits   - Assess range of motion  - Assess patient's mobility; develop plan if impaired  - Assess patient's need for assistive devices and provide as appropriate  - Encourage maximum independence but intervene and supervise when necessary  - Involve family in performance of ADLs  - Assess for home care needs following discharge   - Consider OT consult to assist with ADL evaluation and planning for discharge  - Provide patient education as appropriate  Outcome: Progressing  Goal: Maintains/Returns to pre admission functional level  Description: INTERVENTIONS:  - Perform BMAT or MOVE assessment daily    - Set and communicate daily mobility goal to care team and patient/family/caregiver  - Collaborate with rehabilitation services on mobility goals if consulted  - Perform Range of Motion 4 times a day  - Reposition patient every 2 hours    - Dangle patient 3 times a day  - Stand patient 2 times a day  - Ambulate patient 2 times a day  - Out of bed to chair 2 times a day   - Out of bed for meals 2 times a day  - Out of bed for toileting  - Record patient progress and toleration of activity level   Outcome: Progressing     Problem: DISCHARGE PLANNING  Goal: Discharge to home or other facility with appropriate resources  Description: INTERVENTIONS:  - Identify barriers to discharge w/patient and caregiver  - Arrange for needed discharge resources and transportation as appropriate  - Identify discharge learning needs (meds, wound care, etc )  - Arrange for interpretive services to assist at discharge as needed  - Refer to Case Management Department for coordinating discharge planning if the patient needs post-hospital services based on physician/advanced practitioner order or complex needs related to functional status, cognitive ability, or social support system  Outcome: Progressing     Problem: Knowledge Deficit  Goal: Patient/family/caregiver demonstrates understanding of disease process, treatment plan, medications, and discharge instructions  Description: Complete learning assessment and assess knowledge base    Interventions:  - Provide teaching at level of understanding  - Provide teaching via preferred learning methods  Outcome: Progressing     Problem: Prexisting or High Potential for Compromised Skin Integrity  Goal: Skin integrity is maintained or improved  Description: INTERVENTIONS:  - Identify patients at risk for skin breakdown  - Assess and monitor skin integrity  - Assess and monitor nutrition and hydration status  - Monitor labs   - Assess for incontinence   - Turn and reposition patient  - Assist with mobility/ambulation  - Relieve pressure over bony prominences  - Avoid friction and shearing  - Provide appropriate hygiene as needed including keeping skin clean and dry  - Evaluate need for skin moisturizer/barrier cream  - Collaborate with interdisciplinary team   - Patient/family teaching  - Consider wound care consult   Outcome: Progressing     Problem: MOBILITY - ADULT  Goal: Maintain or return to baseline ADL function  Description: INTERVENTIONS:  -  Assess patient's ability to carry out ADLs; assess patient's baseline for ADL function and identify physical deficits which impact ability to perform ADLs (bathing, care of mouth/teeth, toileting, grooming, dressing, etc )  - Assess/evaluate cause of self-care deficits   - Assess range of motion  - Assess patient's mobility; develop plan if impaired  - Assess patient's need for assistive devices and provide as appropriate  - Encourage maximum independence but intervene and supervise when necessary  - Involve family in performance of ADLs  - Assess for home care needs following discharge   - Consider OT consult to assist with ADL evaluation and planning for discharge  - Provide patient education as appropriate  Outcome: Progressing  Goal: Maintains/Returns to pre admission functional level  Description: INTERVENTIONS:  - Perform BMAT or MOVE assessment daily    - Set and communicate daily mobility goal to care team and patient/family/caregiver  - Collaborate with rehabilitation services on mobility goals if consulted  - Perform Range of Motion 4 times a day  - Reposition patient every 2 hours    - Dangle patient 3 times a day  - Stand patient 2 times a day  - Ambulate patient 2 times a day  - Out of bed to chair 2 times a day   - Out of bed for meals 2 times a day  - Out of bed for toileting  - Record patient progress and toleration of activity level   Outcome: Progressing

## 2023-04-02 NOTE — CASE MANAGEMENT
Case Management Discharge Planning Note    Patient name Felipa Bowens  Location /-66 MRN [id number]  : 1965 Date 2023       Current Admission Date: 3/30/2023  Current Admission Diagnosis:SBO (small bowel obstruction) [facility])   Patient Active Problem List    Diagnosis Date Noted   • Tobacco abuse 2023   • CVA, old, hemiparesis ([facility] [address] ) 2023   • Coronary artery disease involving native coronary artery of native heart 2023   • SBO (small bowel obstruction) ([facility] [address] ) 2023      LOS (days): 3  Geometric Mean LOS (GMLOS) (days): 3 00  Days to GMLOS:0 4     OBJECTIVE:  Risk of Unplanned Readmission Score: 6 29         Current admission status: Inpatient   Preferred Pharmacy: No Pharmacies Listed  Primary Care Provider: Carmen Ding MD    Primary Insurance: MEDICARE  Secondary Insurance: [facility]    DISCHARGE DETAILS:    Discharge planning discussed with[de-identified] patient and patient sister via phone-- Dario Mora at [facility]  [place] of Choice: Yes  Comments - [place] of Choice: [facility] monday 08  CM contacted family/caregiver?: Yes (sister via phone)  Were Treatment Team discharge recommendations reviewed with patient/caregiver?: Yes  Did patient/caregiver verbalize understanding of patient care needs?: Yes  Were patient/caregiver advised of the risks associated with not following Treatment Team discharge recommendations?: Yes    Contacts  Patient Contacts: Mariah Isaac  sister  Relationship to Patient[de-identified] Family  Contact Method: Phone  Phone Number: see face sheet  Reason/Outcome: Discharge [address]         Is the patient interested in [facility] at discharge?:  (resume [facility] 18 hrs per week patient qualifies for 64 hrs per week   pt declines aides after 1500 or on weekends)  [address] requested[de-identified] Home Health Aide                   Treatment Team Recommendation: Home, Other (aides 18 hs per week can have up to 64 hrs per week)  Discharge Destination Plan[de-identified] Short Term Rehab, Home (sister will assist pateint into home on discharge)  Transport at Discharge : BLS Ambulance            Patient stable for discharge today  SUZANNA called [facility] care services to resume aides  Spoke with Chano Smith aide will resume tomorrow Monday at 0800  Patient is getting 18 hrs per week  mon- Friday, but qualifies for 64 hrs per week  Patient declines anyone after 1500 or on weekends  SUZANNA spoke with patient sister Chelly Staff via phone and she will help patient get into apt today  Inex stated she is trying to get patient to accept more help up to 64 hrs per week daily    Request placed in round trip for  Transportation via BLS to patient home today approx pickup at 1500  Confirmation of  time pending  Medical Necessity for transportation was completed  Copy available for transport team  along with face sheet provided for transport home       Confirmed  time 1500 by nicci s  To home sister and /or  mom to be at pt home for patient arrival         Suzanna spoke with patient sister via phone aware of  time for transport home

## 2023-04-02 NOTE — ASSESSMENT & PLAN NOTE
· POA with severe abdominal pain without vomiting  · CT abdomen pelvis with SBO, transition point right lower quadrant  · Hx SBO x2 in 2021 hospitalized at [address] resolved with conservative measures  · No history abdominal surgery  · DVT prophylaxis enoxaparin, SCDs  · GI prophylaxis Protonix  · General surgery consulted  · Tolerating advance diet surgical soft without nausea, vomiting or abdominal pain  · Discussed with surgery-patient stable for discharge

## 2023-04-02 NOTE — PLAN OF CARE
Problem: PAIN - ADULT  Goal: Verbalizes/displays adequate comfort level or baseline comfort level  Description: Interventions:  - Encourage patient to monitor pain and request assistance  - Assess pain using appropriate pain scale  - Administer analgesics based on type and severity of pain and evaluate response  - Implement non-pharmacological measures as appropriate and evaluate response  - Consider cultural and social influences on pain and pain management  - Notify physician/advanced practitioner if interventions unsuccessful or patient reports new pain  Outcome: Progressing     Problem: INFECTION - ADULT  Goal: Absence or prevention of progression during hospitalization  Description: INTERVENTIONS:  - Assess and monitor for signs and symptoms of infection  - Monitor lab/diagnostic results  - Monitor all insertion sites, i e  indwelling lines, tubes, and drains  - Monitor endotracheal if appropriate and nasal secretions for changes in amount and color  - [place] appropriate cooling/warming therapies per order  - Administer medications as ordered  - Instruct and encourage patient and family to use good hand hygiene technique  - Identify and instruct in appropriate isolation precautions for identified infection/condition  Outcome: Progressing     Problem: SAFETY ADULT  Goal: Patient will remain free of falls  Description: INTERVENTIONS:  - Educate patient/family on patient safety including physical limitations  - Instruct patient to call for assistance with activity   - Consult OT/PT to assist with strengthening/mobility   - Keep Call bell within reach  - Keep bed low and locked with side rails adjusted as appropriate  - Keep care items and personal belongings within reach  - Initiate and maintain comfort rounds  - Make Fall Risk Sign visible to staff  - Apply yellow socks and bracelet for high fall risk patients  - Consider moving patient to room near nurses station  Outcome: Progressing  Goal: Maintain or return to baseline ADL function  Description: INTERVENTIONS:  -  Assess patient's ability to carry out ADLs; assess patient's baseline for ADL function and identify physical deficits which impact ability to perform ADLs (bathing, care of mouth/teeth, toileting, grooming, dressing, etc )  - Assess/evaluate cause of self-care deficits   - Assess range of motion  - Assess patient's mobility; develop plan if impaired  - Assess patient's need for assistive devices and provide as appropriate  - Encourage maximum independence but intervene and supervise when necessary  - Involve family in performance of ADLs  - Assess for home care needs following discharge   - Consider OT consult to assist with ADL evaluation and planning for discharge  - Provide patient education as appropriate  Outcome: Progressing  Goal: Maintains/Returns to pre admission functional level  Description: INTERVENTIONS:  - Perform BMAT or MOVE assessment daily    - Set and communicate daily mobility goal to care team and patient/family/caregiver     - Collaborate with rehabilitation services on mobility goals if consultedtoleration of activity level   Outcome: Progressing

## 2023-04-02 NOTE — DISCHARGE INSTR - AVS FIRST PAGE
Dear Will Muhammad,     It was our pleasure to care for you here at [facility]  At this time we provide for you here, the most important instructions / recommendations at discharge:     Notable Medication Adjustments -   Decrease lisinopril to 5mg daily - blood pressures were intermittently low    Testing Required after Discharge -   None  Important follow up information -   Call the pcp sched   Follow-up 1 week  Other Instructions -   Please discuss quitting with your primary care doctor  Sure you are drinking enough liquids daily  If no bowel movement in 3 to 4 days take over-the-counter Colace        Sincerely,   SARAH Holloway

## 2023-04-02 NOTE — DISCHARGE SUMMARY
[address]  Discharge- Manati Ty 1965, 62 y o  male MRN: [id number]  Unit/Bed#: -01 Encounter: [id number]  Primary Care Provider: lAfred Call MD   Date and time admitted to hospital: 3/30/2023  6:51 PM    * SBO (small bowel obstruction) (HCC)  Assessment & Plan  · POA with severe abdominal pain without vomiting  · CT abdomen pelvis with SBO, transition point right lower quadrant  · Hx SBO x2 in 2021 hospitalized at [facility] resolved with conservative measures  · No history abdominal surgery  · DVT prophylaxis enoxaparin, SCDs  · GI prophylaxis Protonix  · General surgery consulted  · Tolerating advance diet surgical soft without nausea, vomiting or abdominal pain  · Discussed with surgery-patient stable for discharge      Coronary artery disease involving native coronary artery of native heart  Assessment & Plan  · History CAD status post angioplasty RCA  · Resume aspirin, high intensity statin   · Denies chest pain  · Tobacco cessation discussed with patient    CVA, old, hemiparesis ([address] )  Assessment & Plan  · 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  · Left-sided flaccid hemiparesis, nonambulatory utilizes wheelchair  · Resume aspirin, high intensity statin    Tobacco abuse  Assessment & Plan  · Reports 1/2 pack/day tobacco abuse  · Declines nicotine replacement therapy  · Cessation counseling provided      Medical Problems     Resolved Problems  Date Reviewed: 4/1/2023   None       Discharging Physician / Practitioner: SARAH Hunt  PCP: Alfred Call MD  Admission Date:   Admission Orders (From admission, onward)     Ordered        03/30/23 2111  INPATIENT ADMISSION  Once                      Discharge Date: 04/02/23    Consultations During Hospital Stay:  · General surgery  · Case management    Procedures Performed:   · None    Significant Findings / Test Results:   CT abdomen pelvisMultiple dilated loops of small bowel with transition point in the right lower quadrant compatible with small bowel obstruction  Transition point is at a loop of small bowel demonstrating moderate wall thickening, underlying infectious or inflammatory enteritis is not excluded  · Wbc 14>7  Incidental Findings:   · None      Test Results Pending at Discharge (will require follow up): · None     Outpatient Tests Requested:  · None    Complications: None    Reason for Admission: Small bowel obstruction    Hospital Course:   Kj Vallejo is a 62 y o  male patient who originally presented to the hospital on 3/30/2023 due to severe abdominal pain with Prior to admissions for SBO   CT with SBO, general surgery consulted and treated with conservative measures  Patient tolerating advanced diet without nausea, vomiting, is passing flatus  Discussed with general surgery prior to discharge as patient has not had a bowel movement, patient appropriate at this time for discharge continue ambulation and out of bed for bowel motility  Please see above list of diagnoses and related plan for additional information  Condition at Discharge: good    Discharge Day Visit / Exam:   Subjective: Patient resting comfortably woke from sleep reports tolerating diet without issue no return of abdominal pain, nausea or vomiting  Is passing flatus, no bowel movement yet  Discussed plan as above with general surgery who is stable from surgical standpoint for discharge  Vitals: Blood Pressure: 94/71 (04/02/23 0729)  Pulse: 75 (03/31/23 2207)  Temperature: 98 1 °F (36 7 °C) (04/02/23 0729)  Temp Source: Temporal (03/31/23 2207)  Respirations: 19 (04/02/23 0729)  Height: 6' (182 9 cm) (03/30/23 1900)  Weight - Scale: 104 kg (228 lb 6 3 oz) (03/30/23 1900)  SpO2: 93 % (03/31/23 2207)  Exam:   Physical Exam  Vitals and nursing note reviewed  Constitutional:       General: He is not in acute distress       Appearance: He is well-developed  HENT:      Head: Normocephalic and atraumatic  Mouth/Throat:      Mouth: Mucous membranes are moist    Eyes:      Conjunctiva/sclera: Conjunctivae normal       Pupils: Pupils are equal, round, and reactive to light  Cardiovascular:      Rate and Rhythm: Normal rate and regular rhythm  Pulses: Normal pulses  Heart sounds: Normal heart sounds  No murmur heard  Pulmonary:      Effort: Pulmonary effort is normal  No respiratory distress  Breath sounds: Normal breath sounds  Abdominal:      General: Bowel sounds are normal  Distension: rounded, soft  Palpations: Abdomen is soft  Tenderness: There is no abdominal tenderness  There is no guarding  Musculoskeletal:         General: No swelling  Cervical back: Neck supple  Right lower leg: No edema  Left lower leg: No edema  Skin:     General: Skin is warm and dry  Capillary Refill: Capillary refill takes less than 2 seconds  Neurological:      General: No focal deficit present  Mental Status: He is alert  Psychiatric:         Mood and Affect: Mood normal           Discussion with Family: Patient declined call to   Discharge instructions/Information to patient and family:   See after visit summary for information provided to patient and family  Provisions for Follow-Up Care:  See after visit summary for information related to follow-up care and any pertinent home health orders  Disposition:   Home    Planned Readmission: none     Discharge Statement:  I spent 40 minutes discharging the patient  This time was spent on the day of discharge  I had direct contact with the patient on the day of discharge  Greater than 50% of the total time was spent examining patient, answering all patient questions, arranging and discussing plan of care with patient as well as directly providing post-discharge instructions    Additional time then spent on discharge activities  Discharge Medications:  See after visit summary for reconciled discharge medications provided to patient and/or family        **Please Note: This note may have been constructed using a voice recognition system**

## 2023-04-02 NOTE — PROGRESS NOTES
Progress Note - General Surgery   Beatriz Yeboah 62 y o  male MRN: [id number]  Unit/Bed#: -01 Encounter: [id number]    Assessment:  61 yo M with pSBO, resolved  AVSS, WBC 7    Plan:  - ok for d/c from surgical standpoint given tolerating soft diet without n/v  +flatus  - encourage oob to at least chair     Rest of care per primary  Discussed with surgeon on call, Dr Darylene Lawless    Subjective/Objective     Subjective: feeling well  Tolerating soft diet without n/v  Continues to pass flatus  Denies feeling bloated  No BM yet    Objective:     Blood pressure 94/71, pulse 75, temperature 98 1 °F (36 7 °C), resp  rate 19, height 6' (1 829 m), weight 104 kg (228 lb 6 3 oz), SpO2 93 %  ,Body mass index is 30 98 kg/m²  Intake/Output Summary (Last 24 hours) at 4/2/2023 1046  Last data filed at 4/2/2023 0815  Gross per 24 hour   Intake 462 ml   Output 1400 ml   Net -938 ml       Invasive Devices     Peripheral Intravenous Line  Duration           Peripheral IV 03/30/23 Left Antecubital 2 days                Physical Exam  Vitals and nursing note reviewed  Constitutional:       General: He is not in acute distress  Appearance: Normal appearance  He is normal weight  He is not ill-appearing, toxic-appearing or diaphoretic  HENT:      Head: Normocephalic and atraumatic  Cardiovascular:      Rate and Rhythm: Normal rate  Pulmonary:      Effort: Pulmonary effort is normal  No respiratory distress  Abdominal:      General: Abdomen is flat  There is no distension  Palpations: Abdomen is soft  Tenderness: There is no abdominal tenderness  There is no guarding or rebound  Skin:     General: Skin is warm  Capillary Refill: Capillary refill takes less than 2 seconds  Neurological:      General: No focal deficit present  Mental Status: He is alert and oriented to person, place, and time     Psychiatric:         Mood and Affect: Mood normal          Behavior: Behavior normal  Scheduled Meds:  Current Facility-Administered Medications   Medication Dose Route Frequency Provider Last Rate   • albuterol  2 puff Inhalation Q4H PRN Yumiko S Maura, CRNP     • aspirin  81 mg Oral Daily SARAH Lopez     • atorvastatin  10 mg Oral Daily With Dinner SARAH Lopez     • ezetimibe  10 mg Oral HS SARAH Lopez     • heparin (porcine)  5,000 Units Subcutaneous Q8H [facility] Hunter Pineda MD     • HYDROmorphone  0 5 mg Intravenous Q3H PRN Yumiko S Maura, CRNP     • LORazepam  0 5 mg Intravenous Q6H PRN Yumiko S Maura, CRNP     • morphine injection  4 mg Intravenous Q3H PRN Yumiko S Maura, CRNP     • ondansetron  4 mg Intravenous Q6H PRN Yumiko S Maura, CRNP     • pantoprazole  40 mg Intravenous Q24H [facility] Yumiko S Maura, CRNP     • sertraline  25 mg Oral Daily SARAH Lopez       Continuous Infusions:   PRN Meds: •  albuterol  •  HYDROmorphone  •  LORazepam  •  morphine injection  •  ondansetron      Lab, Imaging and other studies:  I have personally reviewed pertinent lab results    , CBC:   Lab Results   Component Value Date    WBC 7 36 04/02/2023    HGB 15 5 04/02/2023    HCT 47 4 04/02/2023    MCV 89 04/02/2023     04/02/2023    MCH 29 1 04/02/2023    MCHC 32 7 04/02/2023    RDW 13 2 04/02/2023    MPV 9 3 04/02/2023   , CMP:   Lab Results   Component Value Date    SODIUM 141 04/02/2023    K 4 0 04/02/2023     (H) 04/02/2023    CO2 25 04/02/2023    BUN 13 04/02/2023    CREATININE 0 91 04/02/2023    CALCIUM 8 8 04/02/2023    EGFR 92 04/02/2023   , Coagulation: No results found for: PT, INR, APTT, Urinalysis: No results found for: COLORU, CLARITYU, SPECGRAV, PHUR, LEUKOCYTESUR, NITRITE, PROTEINUA, GLUCOSEU, KETONESU, BILIRUBINUR, BLOODU  VTE Pharmacologic Prophylaxis: Heparin  VTE Mechanical Prophylaxis: sequential compression device      Panchito Brandon PA-C  4/2/2023 10:46 AM

## 2023-04-25 ENCOUNTER — OPTICAL OFFICE (OUTPATIENT)
Dept: URBAN - NONMETROPOLITAN AREA CLINIC 4 | Facility: CLINIC | Age: 58
Setting detail: OPHTHALMOLOGY
End: 2023-04-25

## 2023-04-25 DIAGNOSIS — H52.223: ICD-10-CM

## 2023-04-25 PROCEDURE — V2020 VISION SVCS FRAMES PURCHASES: HCPCS | Performed by: OPTOMETRIST

## 2023-08-02 ENCOUNTER — OPTICAL OFFICE (OUTPATIENT)
Dept: URBAN - NONMETROPOLITAN AREA CLINIC 4 | Facility: CLINIC | Age: 58
Setting detail: OPHTHALMOLOGY
End: 2023-08-02

## 2023-08-02 DIAGNOSIS — H52.223: ICD-10-CM

## 2023-08-02 PROCEDURE — V2020 VISION SVCS FRAMES PURCHASES: HCPCS | Performed by: OPTOMETRIST

## 2023-08-11 DIAGNOSIS — C85.11 UNSPECIFIED B-CELL LYMPHOMA, LYMPH NODES OF HEAD, FACE, AND NECK (HCC): ICD-10-CM

## 2023-08-23 ENCOUNTER — ANESTHESIA EVENT (OUTPATIENT)
Dept: PERIOP | Facility: HOSPITAL | Age: 58
End: 2023-08-23
Payer: MEDICARE

## 2023-08-25 NOTE — PRE-PROCEDURE INSTRUCTIONS
Pre-Surgery Instructions:   Medication Instructions   • albuterol (PROVENTIL HFA,VENTOLIN HFA) 90 mcg/act inhaler Take day of surgery. • aspirin 81 mg chewable tablet Instructions provided by MD   • atorvastatin (LIPITOR) 40 mg tablet Take day of surgery. • bisacodyl (DULCOLAX) 10 mg suppository Hold day of surgery. • esomeprazole (NexIUM) 40 MG capsule Take day of surgery. • ezetimibe (ZETIA) 10 mg tablet Take day of surgery. • fluticasone (FLONASE) 50 mcg/act nasal spray Uses PRN- OK to take day of surgery   • ibuprofen (MOTRIN) 600 mg tablet Stop taking 5 days prior to surgery. • lisinopril (ZESTRIL) 10 mg tablet Hold day of surgery. • naproxen (NAPROSYN) 375 mg tablet Stop taking 5 days prior to surgery. • polyethylene glycol (GLYCOLAX) 17 GM/SCOOP powder Hold day of surgery. • sertraline (ZOLOFT) 25 mg tablet Take day of surgery. Medication instructions for day surgery reviewed. Please use only a sip of water to take your instructed medications. Avoid all over the counter vitamins, supplements and NSAIDS for one week prior to surgery per anesthesia guidelines. Tylenol is ok to take as needed. You will receive a call one business day prior to surgery with an arrival time and hospital directions. If your surgery is scheduled on a Monday, the hospital will be calling you on the Friday prior to your surgery. If you have not heard from anyone by 8pm, please call the hospital supervisor through the hospital  at [phone]. Markell Nix [phone]). Do not eat or drink anything after midnight the night before your surgery, including candy, mints, lifesavers, or chewing gum. Do not drink alcohol 24hrs before your surgery. Try not to smoke at least 24hrs before your surgery. Follow the pre surgery showering instructions as listed in the [facility] Surgical Experience Booklet” or otherwise provided by your surgeon's office. Do not shave the surgical area 24 hours before surgery.  Do not apply any lotions, creams, including makeup, cologne, deodorant, or perfumes after showering on the day of your surgery. No contact lenses, eye make-up, or artificial eyelashes. Remove nail polish, including gel polish, and any artificial, gel, or acrylic nails if possible. Remove all jewelry including rings and body piercing jewelry. Wear causal clothing that is easy to take on and off. Consider your type of surgery. Keep any valuables, jewelry, piercings at home. Please bring any specially ordered equipment (sling, braces) if indicated. Arrange for a responsible person to drive you to and from the hospital on the day of your surgery. Visitor Guidelines discussed. Call the surgeon's office with any new illnesses, exposures, or additional questions prior to surgery. Please reference your [facility] Surgical Experience Booklet” for additional information to prepare for your upcoming surgery.
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
## 2023-08-30 ENCOUNTER — ANESTHESIA (OUTPATIENT)
Dept: PERIOP | Facility: HOSPITAL | Age: 58
End: 2023-08-30
Payer: MEDICARE

## 2023-08-30 ENCOUNTER — HOSPITAL ENCOUNTER (OUTPATIENT)
Facility: HOSPITAL | Age: 58
Setting detail: OUTPATIENT SURGERY
Discharge: HOME | End: 2023-08-30
Attending: OTOLARYNGOLOGY | Admitting: OTOLARYNGOLOGY
Payer: MEDICARE

## 2023-08-30 VITALS
BODY MASS INDEX: 29.52 KG/M2 | WEIGHT: 230 LBS | TEMPERATURE: 98.1 F | SYSTOLIC BLOOD PRESSURE: 108 MMHG | OXYGEN SATURATION: 96 % | RESPIRATION RATE: 18 BRPM | DIASTOLIC BLOOD PRESSURE: 70 MMHG | HEIGHT: 74 IN | HEART RATE: 78 BPM

## 2023-08-30 DIAGNOSIS — R59.0 LOCALIZED ENLARGED LYMPH NODES: ICD-10-CM

## 2023-08-30 PROCEDURE — 88341 IMHCHEM/IMCYTCHM EA ADD ANTB: CPT | Performed by: PATHOLOGY

## 2023-08-30 PROCEDURE — 88360 TUMOR IMMUNOHISTOCHEM/MANUAL: CPT | Performed by: PATHOLOGY

## 2023-08-30 PROCEDURE — 88342 IMHCHEM/IMCYTCHM 1ST ANTB: CPT | Performed by: PATHOLOGY

## 2023-08-30 PROCEDURE — 88184 FLOWCYTOMETRY/ TC 1 MARKER: CPT | Performed by: OTOLARYNGOLOGY

## 2023-08-30 PROCEDURE — 88307 TISSUE EXAM BY PATHOLOGIST: CPT | Performed by: PATHOLOGY

## 2023-08-30 RX ORDER — ONDANSETRON 2 MG/ML
4 INJECTION INTRAMUSCULAR; INTRAVENOUS ONCE AS NEEDED
Status: DISCONTINUED | OUTPATIENT
Start: 2023-08-30 | End: 2023-08-30 | Stop reason: HOSPADM

## 2023-08-30 RX ORDER — PROPOFOL 10 MG/ML
INJECTION, EMULSION INTRAVENOUS AS NEEDED
Status: DISCONTINUED | OUTPATIENT
Start: 2023-08-30 | End: 2023-08-30

## 2023-08-30 RX ORDER — HYDROCODONE BITARTRATE AND ACETAMINOPHEN 5; 325 MG/1; MG/1
2 TABLET ORAL EVERY 6 HOURS PRN
Qty: 30 TABLET | Refills: 0 | Status: SHIPPED | OUTPATIENT
Start: 2023-08-30 | End: 2023-09-09

## 2023-08-30 RX ORDER — CEPHALEXIN 500 MG/1
500 CAPSULE ORAL EVERY 12 HOURS SCHEDULED
Qty: 20 CAPSULE | Refills: 0 | Status: SHIPPED | OUTPATIENT
Start: 2023-08-30 | End: 2023-09-09

## 2023-08-30 RX ORDER — LIDOCAINE HYDROCHLORIDE 10 MG/ML
INJECTION, SOLUTION EPIDURAL; INFILTRATION; INTRACAUDAL; PERINEURAL AS NEEDED
Status: DISCONTINUED | OUTPATIENT
Start: 2023-08-30 | End: 2023-08-30

## 2023-08-30 RX ORDER — DIPHENHYDRAMINE HYDROCHLORIDE 50 MG/ML
12.5 INJECTION INTRAMUSCULAR; INTRAVENOUS ONCE AS NEEDED
Status: DISCONTINUED | OUTPATIENT
Start: 2023-08-30 | End: 2023-08-30 | Stop reason: HOSPADM

## 2023-08-30 RX ORDER — ACETAMINOPHEN 325 MG/1
975 TABLET ORAL ONCE
Status: COMPLETED | OUTPATIENT
Start: 2023-08-30 | End: 2023-08-30

## 2023-08-30 RX ORDER — SODIUM CHLORIDE, SODIUM LACTATE, POTASSIUM CHLORIDE, CALCIUM CHLORIDE 600; 310; 30; 20 MG/100ML; MG/100ML; MG/100ML; MG/100ML
INJECTION, SOLUTION INTRAVENOUS CONTINUOUS PRN
Status: DISCONTINUED | OUTPATIENT
Start: 2023-08-30 | End: 2023-08-30

## 2023-08-30 RX ORDER — LIDOCAINE HYDROCHLORIDE AND EPINEPHRINE 10; 10 MG/ML; UG/ML
INJECTION, SOLUTION INFILTRATION; PERINEURAL AS NEEDED
Status: DISCONTINUED | OUTPATIENT
Start: 2023-08-30 | End: 2023-08-30 | Stop reason: HOSPADM

## 2023-08-30 RX ORDER — PROPOFOL 10 MG/ML
INJECTION, EMULSION INTRAVENOUS CONTINUOUS PRN
Status: DISCONTINUED | OUTPATIENT
Start: 2023-08-30 | End: 2023-08-30

## 2023-08-30 RX ORDER — FENTANYL CITRATE/PF 50 MCG/ML
25 SYRINGE (ML) INJECTION
Status: DISCONTINUED | OUTPATIENT
Start: 2023-08-30 | End: 2023-08-30 | Stop reason: HOSPADM

## 2023-08-30 RX ORDER — DEXAMETHASONE SODIUM PHOSPHATE 10 MG/ML
INJECTION, SOLUTION INTRAMUSCULAR; INTRAVENOUS AS NEEDED
Status: DISCONTINUED | OUTPATIENT
Start: 2023-08-30 | End: 2023-08-30

## 2023-08-30 RX ORDER — ONDANSETRON 2 MG/ML
INJECTION INTRAMUSCULAR; INTRAVENOUS AS NEEDED
Status: DISCONTINUED | OUTPATIENT
Start: 2023-08-30 | End: 2023-08-30

## 2023-08-30 RX ORDER — ALBUTEROL SULFATE 2.5 MG/3ML
2.5 SOLUTION RESPIRATORY (INHALATION) ONCE
Status: COMPLETED | OUTPATIENT
Start: 2023-08-30 | End: 2023-08-30

## 2023-08-30 RX ORDER — MAGNESIUM HYDROXIDE 1200 MG/15ML
LIQUID ORAL AS NEEDED
Status: DISCONTINUED | OUTPATIENT
Start: 2023-08-30 | End: 2023-08-30 | Stop reason: HOSPADM

## 2023-08-30 RX ORDER — MIDAZOLAM HYDROCHLORIDE 2 MG/2ML
INJECTION, SOLUTION INTRAMUSCULAR; INTRAVENOUS AS NEEDED
Status: DISCONTINUED | OUTPATIENT
Start: 2023-08-30 | End: 2023-08-30

## 2023-08-30 RX ORDER — FENTANYL CITRATE 50 UG/ML
INJECTION, SOLUTION INTRAMUSCULAR; INTRAVENOUS AS NEEDED
Status: DISCONTINUED | OUTPATIENT
Start: 2023-08-30 | End: 2023-08-30

## 2023-08-30 RX ADMIN — ACETAMINOPHEN 975 MG: 325 TABLET, FILM COATED ORAL at 11:14

## 2023-08-30 RX ADMIN — DEXAMETHASONE SODIUM PHOSPHATE 10 MG: 10 INJECTION, SOLUTION INTRAMUSCULAR; INTRAVENOUS at 12:01

## 2023-08-30 RX ADMIN — SODIUM CHLORIDE, SODIUM LACTATE, POTASSIUM CHLORIDE, AND CALCIUM CHLORIDE: .6; .31; .03; .02 INJECTION, SOLUTION INTRAVENOUS at 11:54

## 2023-08-30 RX ADMIN — PROPOFOL 150 MCG/KG/MIN: 10 INJECTION, EMULSION INTRAVENOUS at 12:00

## 2023-08-30 RX ADMIN — PROPOFOL 100 MG: 10 INJECTION, EMULSION INTRAVENOUS at 12:04

## 2023-08-30 RX ADMIN — LIDOCAINE HYDROCHLORIDE 50 MG: 10 INJECTION, SOLUTION EPIDURAL; INFILTRATION; INTRACAUDAL; PERINEURAL at 12:00

## 2023-08-30 RX ADMIN — PROPOFOL 200 MG: 10 INJECTION, EMULSION INTRAVENOUS at 12:00

## 2023-08-30 RX ADMIN — FENTANYL CITRATE 50 MCG: 50 INJECTION INTRAMUSCULAR; INTRAVENOUS at 12:19

## 2023-08-30 RX ADMIN — DEXMEDETOMIDINE 8 MCG: 100 INJECTION, SOLUTION, CONCENTRATE INTRAVENOUS at 12:24

## 2023-08-30 RX ADMIN — FENTANYL CITRATE 50 MCG: 50 INJECTION INTRAMUSCULAR; INTRAVENOUS at 12:00

## 2023-08-30 RX ADMIN — ONDANSETRON 4 MG: 2 INJECTION INTRAMUSCULAR; INTRAVENOUS at 12:01

## 2023-08-30 RX ADMIN — ALBUTEROL SULFATE 2.5 MG: 2.5 SOLUTION RESPIRATORY (INHALATION) at 11:14

## 2023-08-30 RX ADMIN — MIDAZOLAM 2 MG: 1 INJECTION INTRAMUSCULAR; INTRAVENOUS at 12:04

## 2023-08-30 RX ADMIN — PROPOFOL 80 MG: 10 INJECTION, EMULSION INTRAVENOUS at 12:20

## 2023-08-30 NOTE — OP NOTE
OPERATIVE REPORT  PATIENT NAME: Cami Bourne    :  1965  MRN: [id number]  Pt Location: OW OR ROOM 02    SURGERY DATE: 2023    Surgeon(s) and Role:     * Margie Castañeda MD - Primary    Preop Diagnosis:  Localized enlarged lymph nodes [R59.0]    Post-Op Diagnosis Codes:     * Localized enlarged lymph nodes [R59.0]    Procedure(s):  Left -deep cervical SUBMANDIBULAR LYMPH NODE EXCISION WITH NEUROMUSCULAR MONITORING and dissection of the lower branches of the facial nerve    Specimen(s):  * No specimens in log *    Estimated Blood Loss:   Minimal    Drains:  * No LDAs found *    Anesthesia Type:   General    Operative Indications:  Localized enlarged lymph nodes [R59.0]      Operative Findings:  Left submandibular lymph node    Complications:   None    Procedure and Technique:  The patient was identified and taken to the operative suite. A timeout was called. After the successful induction of general anesthesia and endotracheal ovation, he was prepped and draped in usual fashion. 1% lidocaine, 1-100,000 epinephrine was injected into the left linear incision in the submandibular triangle. Neuromuscular monitoring was set up to monitor the facial nerve. The area was sterilely prepped and draped. 15 blade was used to incise the skin and subcutaneous tissues and subplatysmal dissection was then carried out superiorly inferiorly. Retractors were placed in superior and inferiorly and identify the capsule of the 3 cm node which was then dissected with serial blunt dissection and bipolar electrocautery. The mentalis and marginal mandibular branch of the facial nerve were identified and found to be densely adherent to the overlying tissue. The posterior facial vein was identified and retracted with the marginal branch. The mentalis branch was dissected away from the capsule of the node and using a peanut dissection the node was delivered.   Its pedicle was taken with serial bipolar electrocautery and sharp technique. There was no significant bleeding. The wound was irrigated and Valsalva maneuver completed. There was no additional bleeding. The dermal layer was closed with 4-0 chromic in interrupted fashion and Dermabond was used for the skin. The patient was awakened and extubated without incident and taken to the PACU in excellent condition. Instrument and sponge counts were correct x2 at the end of the case. I was present for the entire procedure.     Patient Disposition:  PACU         SIGNATURE: Jose Bran MD  DATE: August 30, 2023  TIME: 11:34 AM

## 2023-08-30 NOTE — DISCHARGE SUMMARY
Discharge Summary - Merrick Baumgarten 62 y.o. male MRN: [id number]    Unit/Bed#: OR POOL Encounter: [id number]    Admission Date:     Admitting Diagnosis: Localized enlarged lymph nodes [R59.0]    HPI: Status post excision of left submandibular lymph node with facial nerve dissection    Procedures Performed: No orders of the defined types were placed in this encounter. Summary of Hospital Course: Unremarkable    Significant Findings, Care, Treatment and Services Provided: Surgery    Complications: None    Discharge Diagnosis: Left submandibular adenopathy    Medical Problems     Resolved Problems  Date Reviewed: 8/30/2023   None         Condition at Discharge: good         Discharge instructions/Information to patient and family:   See after visit summary for information provided to patient and family. Provisions for Follow-Up Care:  See after visit summary for information related to follow-up care and any pertinent home health orders. PCP: Corona Richards MD    Disposition: Home    Planned Readmission: No      Discharge Statement   I spent 15 minutes discharging the patient. This time was spent on the day of discharge. I had direct contact with the patient on the day of discharge. Additional documentation is required if more than 30 minutes were spent on discharge. Discharge Medications:  See after visit summary for reconciled discharge medications provided to patient and family.

## 2023-08-30 NOTE — INTERVAL H&P NOTE
H&P reviewed. After examining the patient I find no changes in the patients condition since the H&P had been written.     Vitals:    08/30/23 1104   BP: 110/80   Pulse: 69   Resp: 18   Temp: 98.2 °F (36.8 °C)   SpO2: 97%

## 2023-08-30 NOTE — ANESTHESIA POSTPROCEDURE EVALUATION
Post-Op Assessment Note    CV Status:  Stable  Pain Score: 0    Pain management: adequate  Multimodal analgesia used between 6 hours prior to anesthesia start to PACU discharge    Mental Status:  Unresponsive   Hydration Status:  Euvolemic   PONV Controlled:  None   Airway Patency:  Patent   Two or more mitigation strategies used for obstructive sleep apnea   Post Op Vitals Reviewed: Yes      Staff: CRNA         No notable events documented.     /64 (08/30/23 1250)    Temp 98 °F (36.7 °C) (08/30/23 1250)    Pulse 91 (08/30/23 1250)   Resp 18 (08/30/23 1250)    SpO2 95 % (08/30/23 1250)

## 2023-09-01 LAB — SCAN RESULT: NORMAL

## 2023-09-06 ENCOUNTER — HOSPITAL ENCOUNTER (OUTPATIENT)
Dept: NON INVASIVE DIAGNOSTICS | Facility: HOSPITAL | Age: 58
Discharge: HOME | End: 2023-09-06
Payer: MEDICARE

## 2023-09-06 VITALS
HEART RATE: 70 BPM | SYSTOLIC BLOOD PRESSURE: 110 MMHG | WEIGHT: 230 LBS | DIASTOLIC BLOOD PRESSURE: 70 MMHG | BODY MASS INDEX: 29.52 KG/M2 | HEIGHT: 74 IN

## 2023-09-06 DIAGNOSIS — C85.11 UNSPECIFIED B-CELL LYMPHOMA, LYMPH NODES OF HEAD, FACE, AND NECK (HCC): ICD-10-CM

## 2023-09-06 LAB
AORTIC ROOT: 3.4 CM
APICAL FOUR CHAMBER EJECTION FRACTION: 67 %
E WAVE DECELERATION TIME: 222 MS
E/A RATIO: 0.75
FRACTIONAL SHORTENING: 44 (ref 28–44)
GLOBAL LONGITUIDAL STRAIN: -16 %
INTERVENTRICULAR SEPTUM IN DIASTOLE (PARASTERNAL SHORT AXIS VIEW): 0.9 CM
INTERVENTRICULAR SEPTUM: 0.9 CM (ref 0.6–1.1)
LAAS-AP2: 10.3 CM2
LAAS-AP4: 13.8 CM2
LEFT ATRIUM SIZE: 3.8 CM
LEFT ATRIUM VOLUME (MOD BIPLANE): 24 ML
LEFT ATRIUM VOLUME INDEX (MOD BIPLANE): 10.4
LEFT INTERNAL DIMENSION IN SYSTOLE: 2.7 CM (ref 2.1–4)
LEFT VENTRICLE DIASTOLIC VOLUME (MOD BIPLANE): 46 ML
LEFT VENTRICLE SYSTOLIC VOLUME (MOD BIPLANE): 16 ML
LEFT VENTRICULAR INTERNAL DIMENSION IN DIASTOLE: 4.8 CM (ref 3.5–6)
LEFT VENTRICULAR POSTERIOR WALL IN END DIASTOLE: 1.1 CM
LEFT VENTRICULAR STROKE VOLUME: 82 ML
LV EF: 66 %
LVSV (TEICH): 82 ML
MV E'TISSUE VEL-LAT: 8 CM/S
MV E'TISSUE VEL-SEP: 5 CM/S
MV PEAK A VEL: 0.71 M/S
MV PEAK E VEL: 53 CM/S
MV STENOSIS PRESSURE HALF TIME: 64 MS
MV VALVE AREA P 1/2 METHOD: 3.4
RIGHT ATRIUM AREA SYSTOLE A4C: 11 CM2
RIGHT VENTRICLE ID DIMENSION: 4.9 CM
SL CV LEFT ATRIUM LENGTH A2C: 4.8 CM
SL CV LV EF: 66
SL CV PED ECHO LEFT VENTRICLE DIASTOLIC VOLUME (MOD BIPLANE) 2D: 110 ML
SL CV PED ECHO LEFT VENTRICLE SYSTOLIC VOLUME (MOD BIPLANE) 2D: 28 ML
TRICUSPID ANNULAR PLANE SYSTOLIC EXCURSION: 1.8 CM

## 2023-09-06 PROCEDURE — 88307 TISSUE EXAM BY PATHOLOGIST: CPT | Performed by: PATHOLOGY

## 2023-09-06 PROCEDURE — 93306 TTE W/DOPPLER COMPLETE: CPT

## 2023-09-06 PROCEDURE — 93356 MYOCRD STRAIN IMG SPCKL TRCK: CPT

## 2023-09-06 PROCEDURE — 88342 IMHCHEM/IMCYTCHM 1ST ANTB: CPT | Performed by: PATHOLOGY

## 2023-09-06 PROCEDURE — 88360 TUMOR IMMUNOHISTOCHEM/MANUAL: CPT | Performed by: PATHOLOGY

## 2023-09-06 PROCEDURE — 88341 IMHCHEM/IMCYTCHM EA ADD ANTB: CPT | Performed by: PATHOLOGY

## 2023-09-19 RX ORDER — SODIUM CHLORIDE 9 MG/ML
75 INJECTION, SOLUTION INTRAVENOUS CONTINUOUS
Status: CANCELLED | OUTPATIENT
Start: 2023-09-19

## 2023-09-22 ENCOUNTER — HOSPITAL ENCOUNTER (OUTPATIENT)
Dept: MRI IMAGING | Facility: HOSPITAL | Age: 58
End: 2023-09-22
Payer: MEDICARE

## 2023-09-22 DIAGNOSIS — C82.18 FOLLICULAR LYMPHOMA GRADE II, LYMPH NODES OF MULTIPLE SITES (HCC): ICD-10-CM

## 2023-09-22 PROCEDURE — A9585 GADOBUTROL INJECTION: HCPCS | Performed by: RADIOLOGY

## 2023-09-22 PROCEDURE — 70553 MRI BRAIN STEM W/O & W/DYE: CPT

## 2023-09-22 RX ORDER — GADOBUTROL 604.72 MG/ML
10 INJECTION INTRAVENOUS
Status: COMPLETED | OUTPATIENT
Start: 2023-09-22 | End: 2023-09-22

## 2023-09-22 RX ADMIN — GADOBUTROL 10 ML: 604.72 INJECTION INTRAVENOUS at 16:56

## 2023-09-27 ENCOUNTER — HOSPITAL ENCOUNTER (OUTPATIENT)
Dept: INTERVENTIONAL RADIOLOGY/VASCULAR | Facility: HOSPITAL | Age: 58
Discharge: HOME | End: 2023-09-27
Payer: MEDICARE

## 2023-09-27 VITALS
HEART RATE: 60 BPM | OXYGEN SATURATION: 99 % | TEMPERATURE: 97.1 F | WEIGHT: 230 LBS | BODY MASS INDEX: 29.52 KG/M2 | HEIGHT: 74 IN | SYSTOLIC BLOOD PRESSURE: 137 MMHG | RESPIRATION RATE: 18 BRPM | DIASTOLIC BLOOD PRESSURE: 78 MMHG

## 2023-09-27 DIAGNOSIS — C85.11 UNSPECIFIED B-CELL LYMPHOMA, LYMPH NODES OF HEAD, FACE, AND NECK (HCC): ICD-10-CM

## 2023-09-27 LAB
ERYTHROCYTE [DISTWIDTH] IN BLOOD BY AUTOMATED COUNT: 13.1 % (ref 11.6–15.1)
HCT VFR BLD AUTO: 46.1 % (ref 36.5–49.3)
HGB BLD-MCNC: 15 G/DL (ref 12–17)
MCH RBC QN AUTO: 29.5 PG (ref 26.8–34.3)
MCHC RBC AUTO-ENTMCNC: 32.5 G/DL (ref 31.4–37.4)
MCV RBC AUTO: 91 FL (ref 82–98)
NRBC BLD AUTO-RTO: 0 /100 WBCS
PLATELET # BLD AUTO: 382 THOUSANDS/UL (ref 149–390)
PMV BLD AUTO: 9.6 FL (ref 8.9–12.7)
RBC # BLD AUTO: 5.09 MILLION/UL (ref 3.88–5.62)
WBC # BLD AUTO: 8.17 THOUSAND/UL (ref 4.31–10.16)

## 2023-09-27 PROCEDURE — 99152 MOD SED SAME PHYS/QHP 5/>YRS: CPT

## 2023-09-27 PROCEDURE — 88364 INSITU HYBRIDIZATION (FISH): CPT | Performed by: PATHOLOGY

## 2023-09-27 PROCEDURE — 88305 TISSUE EXAM BY PATHOLOGIST: CPT | Performed by: PATHOLOGY

## 2023-09-27 PROCEDURE — 88365 INSITU HYBRIDIZATION (FISH): CPT | Performed by: PATHOLOGY

## 2023-09-27 PROCEDURE — 99153 MOD SED SAME PHYS/QHP EA: CPT

## 2023-09-27 PROCEDURE — 85097 BONE MARROW INTERPRETATION: CPT | Performed by: PATHOLOGY

## 2023-09-27 PROCEDURE — 88185 FLOWCYTOMETRY/TC ADD-ON: CPT

## 2023-09-27 PROCEDURE — 88311 DECALCIFY TISSUE: CPT | Performed by: PATHOLOGY

## 2023-09-27 PROCEDURE — 88313 SPECIAL STAINS GROUP 2: CPT | Performed by: PATHOLOGY

## 2023-09-27 PROCEDURE — 88184 FLOWCYTOMETRY/ TC 1 MARKER: CPT | Performed by: RADIOLOGY

## 2023-09-27 PROCEDURE — 88373 M/PHMTRC ALYS ISHQUANT/SEMIQ: CPT

## 2023-09-27 PROCEDURE — 88342 IMHCHEM/IMCYTCHM 1ST ANTB: CPT | Performed by: PATHOLOGY

## 2023-09-27 PROCEDURE — 38222 DX BONE MARROW BX & ASPIR: CPT

## 2023-09-27 PROCEDURE — 88262 CHROMOSOME ANALYSIS 15-20: CPT | Performed by: RADIOLOGY

## 2023-09-27 PROCEDURE — 88237 TISSUE CULTURE BONE MARROW: CPT | Performed by: RADIOLOGY

## 2023-09-27 PROCEDURE — 88341 IMHCHEM/IMCYTCHM EA ADD ANTB: CPT | Performed by: PATHOLOGY

## 2023-09-27 PROCEDURE — 88374 M/PHMTRC ALYS ISHQUANT/SEMIQ: CPT | Performed by: RADIOLOGY

## 2023-09-27 PROCEDURE — 88367 INSITU HYBRIDIZATION AUTO: CPT

## 2023-09-27 PROCEDURE — 88360 TUMOR IMMUNOHISTOCHEM/MANUAL: CPT | Performed by: PATHOLOGY

## 2023-09-27 PROCEDURE — 85007 BL SMEAR W/DIFF WBC COUNT: CPT | Performed by: STUDENT IN AN ORGANIZED HEALTH CARE EDUCATION/TRAINING PROGRAM

## 2023-09-27 RX ORDER — SODIUM CHLORIDE 9 MG/ML
75 INJECTION, SOLUTION INTRAVENOUS CONTINUOUS
Status: DISCONTINUED | OUTPATIENT
Start: 2023-09-27 | End: 2023-09-28 | Stop reason: HOSPADM

## 2023-09-27 RX ORDER — ACETAMINOPHEN 325 MG/1
650 TABLET ORAL EVERY 4 HOURS PRN
Status: DISCONTINUED | OUTPATIENT
Start: 2023-09-27 | End: 2023-09-28 | Stop reason: HOSPADM

## 2023-09-27 RX ORDER — SERTRALINE HYDROCHLORIDE 100 MG/1
100 TABLET, FILM COATED ORAL EVERY MORNING
COMMUNITY
Start: 2023-09-11

## 2023-09-27 RX ORDER — PANTOPRAZOLE SODIUM 40 MG/1
TABLET, DELAYED RELEASE ORAL
COMMUNITY
Start: 2023-09-11

## 2023-09-27 RX ORDER — FENTANYL CITRATE 50 UG/ML
INJECTION, SOLUTION INTRAMUSCULAR; INTRAVENOUS AS NEEDED
Status: COMPLETED | OUTPATIENT
Start: 2023-09-27 | End: 2023-09-27

## 2023-09-27 RX ORDER — MIDAZOLAM HYDROCHLORIDE 2 MG/2ML
INJECTION, SOLUTION INTRAMUSCULAR; INTRAVENOUS AS NEEDED
Status: COMPLETED | OUTPATIENT
Start: 2023-09-27 | End: 2023-09-27

## 2023-09-27 RX ORDER — LANOLIN ALCOHOL/MO/W.PET/CERES
3 CREAM (GRAM) TOPICAL
COMMUNITY
Start: 2023-09-12

## 2023-09-27 RX ORDER — ONDANSETRON 4 MG/1
4 TABLET, FILM COATED ORAL EVERY 6 HOURS PRN
COMMUNITY
Start: 2023-09-12

## 2023-09-27 RX ORDER — ASPIRIN 81 MG
TABLET,CHEWABLE ORAL
COMMUNITY
Start: 2023-09-12

## 2023-09-27 RX ORDER — LIDOCAINE WITH 8.4% SOD BICARB 0.9%(10ML)
SYRINGE (ML) INJECTION AS NEEDED
Status: COMPLETED | OUTPATIENT
Start: 2023-09-27 | End: 2023-09-27

## 2023-09-27 RX ORDER — AMOXICILLIN 250 MG
1 CAPSULE ORAL
COMMUNITY
Start: 2023-08-04

## 2023-09-27 RX ADMIN — SODIUM CHLORIDE 75 ML/HR: 0.9 INJECTION, SOLUTION INTRAVENOUS at 07:31

## 2023-09-27 RX ADMIN — FENTANYL CITRATE 50 MCG: 50 INJECTION, SOLUTION INTRAMUSCULAR; INTRAVENOUS at 08:48

## 2023-09-27 RX ADMIN — Medication 10 ML: at 08:54

## 2023-09-27 RX ADMIN — MIDAZOLAM 1 MG: 1 INJECTION INTRAMUSCULAR; INTRAVENOUS at 08:48

## 2023-09-27 NOTE — NURSING NOTE
Tolerating po well, denies complaints. Family at bedside to take home. Discharge instructions given.

## 2023-09-27 NOTE — BRIEF OP NOTE (RAD/CATH)
INTERVENTIONAL RADIOLOGY PROCEDURE NOTE    Date: 9/27/2023    Procedure:   Procedure Summary     Date: 09/27/23 Room / Location: [facility] Interventional Radiology    Anesthesia Start:  Anesthesia Stop:     Procedure: IR BIOPSY BONE MARROW Diagnosis:       Unspecified B-cell lymphoma, lymph nodes of head, face, and neck (HCC)      (lymphoma staging )    Scheduled Providers:  Responsible Provider:     Anesthesia Type: Not recorded ASA Status: Not recorded          Preoperative diagnosis:   1. Unspecified B-cell lymphoma, lymph nodes of head, face, and neck (HCC)         Postoperative diagnosis: Same. Surgeon: Everett Rodriguez MD     Assistant: None. No qualified resident was available. Blood loss: None    Specimens: Aspirate and core    Findings: Bone marrow biopsy with fluoroscopic guidance. Had brief vagal response post procedure which responded quickly with IV fluids and time. Complications: None immediate.     Anesthesia: conscious sedation

## 2023-09-27 NOTE — H&P
Interventional Radiology  History and Physical 9/27/2023     Socrates Salts   1965   [id number]    H&P reviewed. There have been no interval changes since the time the H&P was written. /83 (BP Location: Left arm)   Pulse 58   Temp (!) 97.2 °F (36.2 °C) (Temporal)   Resp 18   Ht 6' 2" (1.88 m)   Wt 104 kg (230 lb)   SpO2 96%   BMI 29.53 kg/m²     31-year-old man here today for bone marrow biopsy. Procedure discussed and all questions answered. Informed written consent was obtained.     Daksha Mcgill MD

## 2023-09-27 NOTE — SEDATION DOCUMENTATION
The patient became bradycardic with HR in the low 40's  with moderate hypotension. Patient denies any discomfort or dizziness. We turned him back on the stretcher and placed him in trendelenburg position.   Dr. Leonidas Lazo made aware of the patient's VS.

## 2023-09-27 NOTE — DISCHARGE INSTRUCTIONS
Bone Marrow Biopsy     WHAT YOU NEED TO KNOW:   A bone marrow biopsy is a procedure to remove a small amount of bone marrow from your bone. Bone marrow is the soft tissue inside your bone that helps to make blood cells. The sample is tested for disease or infection. DISCHARGE INSTRUCTIONS:     1. Limit your activities day of biopsy as directed by your doctor. 2. Use medication as ordered. 3. Return to your normal diet. Small sips of flat soda will help with nausea. 4. Remove band-aid or dressing 24 hours after procedure. Contact Interventional Radiology at [phone] [facility] PATIENTS: Contact Interventional Radiology at [phone]) [facility] PATIENTS: Contact Interventional Radiology at [phone]) if:    1. Difficulty breathing, nausea or vomiting. 2. Chills or fever above 101 F.    3. Pain at biopsy site not relieved by medication. 4. Develop any redness, swelling, heat, unusual drainage, heavy bruising or bleeding from biopsy site. Procedural Sedation   WHAT YOU NEED TO KNOW:   Procedural sedation is medicine used during procedures to help you feel relaxed and calm. You will remember little to none of the procedure. After sedation you may feel tired, weak, or unsteady on your feet. You may also have trouble concentrating or short-term memory loss. These symptoms should go away in 24 hours or less. DISCHARGE INSTRUCTIONS:     Call 911 or have someone else call for any of the following: You have sudden trouble breathing. You cannot be woken. Contact Interventional Radiology at [phone]   [facility] PATIENTS: Contact Interventional Radiology at [phone]) [facility] PATIENTS: Contact Interventional Radiology at [phone]) if any of the following occur: You have a severe headache or dizziness. Your heart is beating faster than usual.    You have a fever or chills. Your skin is itchy, swollen, or you have a rash.      You have nausea or are vomiting for more than 8 hours after the procedure. You have questions or concerns about your condition or care. Self-care:   Have someone stay with you for 24 hours. This person can drive you to errands and help you do things around the house. This person can also watch for problems. Rest and do quiet activities for 24 hours. Do not exercise, ride a bike, or play sports. Stand up slowly to prevent dizziness and falls. Take short walks around the house with another person. Slowly return to your usual activities the next day. Do not drive or use dangerous machines or tools for 24 hours. You may injure yourself or others. Examples include a lawnmower, saw, or drill. Do not return to work for 24 hours if you use dangerous machines or tools for work. Do not make important decisions for 24 hours. For example, do not sign important papers or invest money. Drink liquids as directed. Liquids help flush the sedation medicine out of your body. Ask how much liquid to drink each day and which liquids are best for you. Eat small, frequent meals to prevent nausea and vomiting. Start with clear liquids such as juice or broth. If you do not vomit after clear liquids, you can eat your usual foods. Do not drink alcohol or take medicines that make you drowsy. This includes medicines that help you sleep and anxiety medicines. Ask your healthcare provider if it is safe for you to take pain medicine. Follow up with your healthcare provider as directed: Write down your questions so you remember to ask them during your visits. Procedural Sedation   WHAT YOU NEED TO KNOW:   Procedural sedation is medicine used during procedures to help you feel relaxed and calm. You will remember little to none of the procedure. After sedation you may feel tired, weak, or unsteady on your feet. You may also have trouble concentrating or short-term memory loss. These symptoms should go away in 24 hours or less.    DISCHARGE INSTRUCTIONS:     Call 911 or have someone else call for any of the following: You have sudden trouble breathing. You cannot be woken. Contact Interventional Radiology at [phone]   [facility] PATIENTS: Contact Interventional Radiology at [phone]) [facility] PATIENTS: Contact Interventional Radiology at [phone]) if any of the following occur: You have a severe headache or dizziness. Your heart is beating faster than usual.    You have a fever or chills. Your skin is itchy, swollen, or you have a rash. You have nausea or are vomiting for more than 8 hours after the procedure. You have questions or concerns about your condition or care. Self-care:   Have someone stay with you for 24 hours. This person can drive you to errands and help you do things around the house. This person can also watch for problems. Rest and do quiet activities for 24 hours. Do not exercise, ride a bike, or play sports. Stand up slowly to prevent dizziness and falls. Take short walks around the house with another person. Slowly return to your usual activities the next day. Do not drive or use dangerous machines or tools for 24 hours. You may injure yourself or others. Examples include a lawnmower, saw, or drill. Do not return to work for 24 hours if you use dangerous machines or tools for work. Do not make important decisions for 24 hours. For example, do not sign important papers or invest money. Drink liquids as directed. Liquids help flush the sedation medicine out of your body. Ask how much liquid to drink each day and which liquids are best for you. Eat small, frequent meals to prevent nausea and vomiting. Start with clear liquids such as juice or broth. If you do not vomit after clear liquids, you can eat your usual foods. Do not drink alcohol or take medicines that make you drowsy. This includes medicines that help you sleep and anxiety medicines.  Ask your healthcare provider if it is safe for you to take pain medicine. Follow up with your healthcare provider as directed: Write down your questions so you remember to ask them during your visits.

## 2023-09-27 NOTE — SEDATION DOCUMENTATION
IVF opened wide and frequent VS obtained. HR quickly normalized and BP returned to close to baseline.

## 2023-10-02 LAB — SCAN RESULT: NORMAL

## 2023-10-02 PROCEDURE — 88311 DECALCIFY TISSUE: CPT | Performed by: PATHOLOGY

## 2023-10-02 PROCEDURE — 88364 INSITU HYBRIDIZATION (FISH): CPT | Performed by: PATHOLOGY

## 2023-10-02 PROCEDURE — 85097 BONE MARROW INTERPRETATION: CPT | Performed by: PATHOLOGY

## 2023-10-02 PROCEDURE — 88342 IMHCHEM/IMCYTCHM 1ST ANTB: CPT | Performed by: PATHOLOGY

## 2023-10-02 PROCEDURE — 88360 TUMOR IMMUNOHISTOCHEM/MANUAL: CPT | Performed by: PATHOLOGY

## 2023-10-02 PROCEDURE — 88341 IMHCHEM/IMCYTCHM EA ADD ANTB: CPT | Performed by: PATHOLOGY

## 2023-10-02 PROCEDURE — 88313 SPECIAL STAINS GROUP 2: CPT | Performed by: PATHOLOGY

## 2023-10-02 PROCEDURE — 88365 INSITU HYBRIDIZATION (FISH): CPT | Performed by: PATHOLOGY

## 2023-10-02 PROCEDURE — 88305 TISSUE EXAM BY PATHOLOGIST: CPT | Performed by: PATHOLOGY

## 2023-10-03 LAB — MISCELLANEOUS LAB TEST RESULT: NORMAL

## 2023-10-04 LAB
BASOPHILS NFR BLD MANUAL: 1 % (ref 0–1)
IMM EOSINOPHIL NFR BLD MANUAL: 3 % (ref 0–6)
LYMPHOCYTES NFR BLD: 34 % (ref 14–44)
MONOCYTES NFR BLD AUTO: 6 % (ref 4–12)
NEUTS SEG NFR BLD AUTO: 56 % (ref 45–77)
PLATELET BLD QL SMEAR: ADEQUATE
RBC MORPH BLD: NORMAL
TOTAL CELLS COUNTED SPEC: 100

## 2023-10-05 LAB
MISCELLANEOUS LAB TEST RESULT: NORMAL
SCAN RESULT: NORMAL

## 2023-10-09 LAB — MISCELLANEOUS LAB TEST RESULT: NORMAL

## 2023-10-24 ENCOUNTER — DOCTOR'S OFFICE (OUTPATIENT)
Dept: URBAN - NONMETROPOLITAN AREA CLINIC 1 | Facility: CLINIC | Age: 58
Setting detail: OPHTHALMOLOGY
End: 2023-10-24
Payer: COMMERCIAL

## 2023-10-24 DIAGNOSIS — H52.4: ICD-10-CM

## 2023-10-24 DIAGNOSIS — H52.03: ICD-10-CM

## 2023-10-24 PROBLEM — H25.013 CORTICAL CATARACT; BOTH EYES: Status: ACTIVE | Noted: 2023-10-24

## 2023-10-24 PROCEDURE — 92014 COMPRE OPH EXAM EST PT 1/>: CPT | Performed by: OPTOMETRIST

## 2023-10-24 ASSESSMENT — REFRACTION_AUTOREFRACTION
OD_SPHERE: +3.00
OD_CYLINDER: -0.75
OD_AXIS: 097
OS_SPHERE: +3.25
OS_CYLINDER: -1.00
OS_AXIS: 132

## 2023-10-24 ASSESSMENT — REFRACTION_CURRENTRX
OD_CYLINDER: -0.75
OS_CYLINDER: -1.00
OD_AXIS: 085
OD_OVR_VA: 20/
OS_AXIS: 115
OS_SPHERE: +3.75
OS_VPRISM_DIRECTION: SV
OS_OVR_VA: 20/
OD_SPHERE: +3.50
OD_VPRISM_DIRECTION: SV

## 2023-10-24 ASSESSMENT — VISUAL ACUITY
OS_BCVA: 20/20
OD_BCVA: 20/20

## 2023-10-24 ASSESSMENT — REFRACTION_MANIFEST
OS_AXIS: 110
OS_VA2: 20/20-2
OD_CYLINDER: -0.75
OS_ADD: +2.50
OD_VA1: 20/20-2
OS_SPHERE: +2.75
OD_AXIS: 095
OD_SPHERE: +2.50
OS_VA1: 20/20-2
OD_VA2: 20/20-2
OS_CYLINDER: -0.75
OD_ADD: +2.50

## 2023-10-24 ASSESSMENT — TONOMETRY
OD_IOP_MMHG: 14
OS_IOP_MMHG: 14

## 2023-10-24 ASSESSMENT — CONFRONTATIONAL VISUAL FIELD TEST (CVF)
OD_FINDINGS: CONSTRICTION
OS_FINDINGS: CONSTRICTION

## 2023-10-24 ASSESSMENT — SPHEQUIV_DERIVED
OD_SPHEQUIV: 2.125
OD_SPHEQUIV: 2.625
OS_SPHEQUIV: 2.375
OS_SPHEQUIV: 2.75

## 2023-12-15 ENCOUNTER — HOSPITAL ENCOUNTER (OUTPATIENT)
Dept: MRI IMAGING | Facility: HOSPITAL | Age: 58
End: 2023-12-15
Payer: MEDICARE

## 2023-12-15 DIAGNOSIS — C82.18 FOLLICULAR LYMPHOMA GRADE II, LYMPH NODES OF MULTIPLE SITES (HCC): ICD-10-CM

## 2023-12-15 DIAGNOSIS — D49.89 NEOPLASM OF UNSPECIFIED BEHAVIOR OF OTHER SPECIFIED SITES: ICD-10-CM

## 2023-12-15 PROCEDURE — 70553 MRI BRAIN STEM W/O & W/DYE: CPT

## 2023-12-15 PROCEDURE — A9585 GADOBUTROL INJECTION: HCPCS | Performed by: RADIOLOGY

## 2023-12-15 RX ORDER — GADOBUTROL 604.72 MG/ML
10 INJECTION INTRAVENOUS
Status: COMPLETED | OUTPATIENT
Start: 2023-12-15 | End: 2023-12-15

## 2023-12-15 RX ADMIN — GADOBUTROL 10 ML: 604.72 INJECTION INTRAVENOUS at 11:22

## 2024-01-08 ENCOUNTER — HOSPITAL ENCOUNTER (OUTPATIENT)
Dept: NON INVASIVE DIAGNOSTICS | Facility: HOSPITAL | Age: 59
Discharge: HOME | End: 2024-01-08
Payer: MEDICARE

## 2024-01-08 DIAGNOSIS — R60.0 LOCALIZED EDEMA: ICD-10-CM

## 2024-01-08 DIAGNOSIS — L03.116 CELLULITIS OF LEFT LOWER EXTREMITY: ICD-10-CM

## 2024-01-08 PROCEDURE — 93970 EXTREMITY STUDY: CPT

## 2024-01-09 PROCEDURE — 93970 EXTREMITY STUDY: CPT | Performed by: SURGERY

## 2024-01-18 RX ORDER — CAPSAICIN 0.025 %
CREAM (GRAM) TOPICAL
COMMUNITY
Start: 2023-11-24

## 2024-01-18 RX ORDER — SACCHAROMYCES BOULARDII 250 MG
250 CAPSULE ORAL DAILY
COMMUNITY
Start: 2024-01-04

## 2024-01-22 ENCOUNTER — OFFICE VISIT (OUTPATIENT)
Dept: PODIATRY | Age: 59
End: 2024-01-22
Payer: MEDICARE

## 2024-01-22 VITALS
SYSTOLIC BLOOD PRESSURE: 122 MMHG | DIASTOLIC BLOOD PRESSURE: 82 MMHG | HEART RATE: 68 BPM | HEIGHT: 74 IN | OXYGEN SATURATION: 96 % | BODY MASS INDEX: 29.53 KG/M2

## 2024-01-22 DIAGNOSIS — I73.9 PVD (PERIPHERAL VASCULAR DISEASE) (HCC): ICD-10-CM

## 2024-01-22 DIAGNOSIS — L85.3 XEROSIS CUTIS: ICD-10-CM

## 2024-01-22 DIAGNOSIS — B35.1 ONYCHOMYCOSIS: ICD-10-CM

## 2024-01-22 PROCEDURE — 99203 OFFICE O/P NEW LOW 30 MIN: CPT | Performed by: STUDENT IN AN ORGANIZED HEALTH CARE EDUCATION/TRAINING PROGRAM

## 2024-01-22 PROCEDURE — 11721 DEBRIDE NAIL 6 OR MORE: CPT | Performed by: STUDENT IN AN ORGANIZED HEALTH CARE EDUCATION/TRAINING PROGRAM

## 2024-01-22 RX ORDER — AMMONIUM LACTATE 12 G/100G
CREAM TOPICAL AS NEEDED
Qty: 385 G | Refills: 2 | Status: SHIPPED | OUTPATIENT
Start: 2024-01-22

## 2024-01-22 NOTE — PROGRESS NOTES
Assessment/Plan:     Diagnoses and all orders for this visit:    PVD (peripheral vascular disease) (HCC)    Onychomycosis    Xerosis cutis  -     ammonium lactate (LAC-HYDRIN) 12 % cream; Apply topically as needed for dry skin    Other orders  -     saccharomyces boulardii (FLORASTOR) 250 mg capsule; Take 250 mg by mouth daily  -     capsaicin (ZOSTRIX) 0.025 % cream; APPLY TOPICALLY TO AFFECTED AREA TWICE  DAY          IMPRESSION:  PVD (Q8) w/ onychomycosis   Xerosis      PLAN:  I reviewed clinical exam with patient in detail today. I have discussed with the patient the pathophysiology of this diagnosis and reviewed how the examination correlates with this diagnosis.  I reviewed PCP note from 10/24/23  VDUS 1/8/24 reviewed: No DVT. Arterial waveforms triphasic B/L.   Foot exam as below. Patient has significant lower extremity risk due to diminished pulses in the feet and trophic skin changes to the lower extremity including thick toenail, atrophic skin, and decreased hair growth.  Debridement of toenails. Using nail nipper, dereje, and curette, nails were sharply debrided, reduced in thickness and length. Devitalized nail tissue and fungal debris excised and removed. Patient tolerated well.    Amlactin rx'd for daily use on feet  Discussed proper shoe gear, daily inspections of feet, and general foot health with patient.   Patient has Q8  findings and is recommended for at risk foot care every 9-10 weeks.      Subjective:      Patient ID: Braden Watkins is a 58 y.o. male.    Braden presents to clinic today concerning nail care. Nails are long and thick and he cannot cut them. L-sided hemiparesis.         The following portions of the patient's history were reviewed and updated as appropriate: allergies, current medications, past family history, past medical history, past social history, past surgical history, and problem list.    Review of Systems   Constitutional:  Negative for activity change, chills and fever.  "  HENT: Negative.     Respiratory:  Negative for cough, chest tightness and shortness of breath.    Cardiovascular:  Negative for chest pain and leg swelling.   Endocrine: Negative.    Genitourinary: Negative.    Neurological: Negative.  Negative for numbness.   Psychiatric/Behavioral: Negative.  Negative for agitation and behavioral problems.          Objective:      /82   Pulse 68   Ht 6' 2\" (1.88 m)   SpO2 96%   BMI 29.53 kg/m²          Physical Exam  Constitutional:       General: He is not in acute distress.     Appearance: Normal appearance. He is not ill-appearing.   Cardiovascular:      Comments: Bilateral DP pulses are diminished 1/4. Bilateral PT pulses are nonpalpable. Pedal hair is diminished. Legs to toes warm to cool. Varicosities with mild LE edema noted.  Pulmonary:      Effort: No respiratory distress.   Musculoskeletal:         General: No tenderness or deformity. Normal range of motion.   Skin:     Capillary Refill: Capillary refill takes less than 2 seconds.      Comments: B/L LE skin is atrophic - thin, dry and shiny in appearance. No open wounds noted.   Toenails x10 are elongated, dystrophic, discolored with thickening and subungual debris.   Neurological:      General: No focal deficit present.      Mental Status: He is alert and oriented to person, place, and time.      Comments: LLE paresthesia   Psychiatric:         Mood and Affect: Mood normal.         Behavior: Behavior normal.           "

## 2024-03-13 LAB
EXTERNAL HIV SCREEN: NORMAL
HCV AB SER-ACNC: NEGATIVE

## 2024-04-12 RX ORDER — AMOXICILLIN AND CLAVULANATE POTASSIUM 875; 125 MG/1; MG/1
875 TABLET, FILM COATED ORAL 2 TIMES DAILY
COMMUNITY
Start: 2024-01-04

## 2024-04-12 RX ORDER — DICYCLOMINE HCL 20 MG
20 TABLET ORAL EVERY 6 HOURS PRN
COMMUNITY
Start: 2024-02-12

## 2024-04-17 ENCOUNTER — OFFICE VISIT (OUTPATIENT)
Dept: PODIATRY | Age: 59
End: 2024-04-17
Payer: MEDICARE

## 2024-04-17 VITALS
SYSTOLIC BLOOD PRESSURE: 110 MMHG | DIASTOLIC BLOOD PRESSURE: 70 MMHG | HEART RATE: 72 BPM | BODY MASS INDEX: 29.53 KG/M2 | HEIGHT: 74 IN | TEMPERATURE: 98 F | OXYGEN SATURATION: 95 %

## 2024-04-17 DIAGNOSIS — I73.9 PVD (PERIPHERAL VASCULAR DISEASE) (HCC): Primary | ICD-10-CM

## 2024-04-17 DIAGNOSIS — B35.1 ONYCHOMYCOSIS: ICD-10-CM

## 2024-04-17 PROCEDURE — 11721 DEBRIDE NAIL 6 OR MORE: CPT | Performed by: STUDENT IN AN ORGANIZED HEALTH CARE EDUCATION/TRAINING PROGRAM

## 2024-04-17 NOTE — PROGRESS NOTES
Braden Watkins  1965  AT RISK FOOT CARE    1. PVD (peripheral vascular disease) ([facility])        2. Onychomycosis            Patient presents for at-risk foot care.  Patient has no acute concerns today.  Patient has significant lower extremity risk due to diminished pulses in the feet and trophic skin changes to the lower extremity including thick toenail, atrophic skin, and decreased hair growth.      On exam patient has thickened, hypertrophic, discolored, brittle toenails with subungual debris and tenderness x10  Patient has diminished pedal pulses and decreased perfusion to the lower extremities  Patient has significant trophic changes to the skin including thick toenails, decreased pedal hair and atrophic skin.     Today's treatment includes:  Debridement of toenails. Using nail nipper, dereje, and curette, nails were sharply debrided, reduced in thickness and length. Devitalized nail tissue and fungal debris excised and removed. Patient tolerated well.      Discussed proper shoe gear, daily inspections of feet, and general foot health with patient. Patient has Q8  findings and is recommended for at risk foot care every 9-10 weeks.    Patients most recent complete clinical foot exam was on: 1/22/24

## 2024-04-29 ENCOUNTER — TELEPHONE (OUTPATIENT)
Dept: ADMINISTRATIVE | Facility: OTHER | Age: 59
End: 2024-04-29

## 2024-04-29 NOTE — TELEPHONE ENCOUNTER
Upon review of the In Basket request we were able to locate, review, and update the patient chart as requested for Hepatitis C  and HIV.    Any additional questions or concerns should be emailed to the Practice Liaisons via the appropriate education email address, please do not reply via In Basket.    Thank you  ZHANNA QUIGLEY

## 2024-04-29 NOTE — TELEPHONE ENCOUNTER
----- Message from Ai Macedo sent at 4/29/2024  9:02 AM EDT -----  Regarding: quality metric update  04/29/24 9:02 AM    Hello, our patient above has had Hepatitis C and HIV completed/performed. Please assist in updating the patient chart by pulling the Care Everywhere (CE) document. The date of service is 03/13/2024.     Thank you,  Ai Macedo  [facility] PRIMARY [facility]

## 2024-07-16 ENCOUNTER — HOSPITAL ENCOUNTER (OUTPATIENT)
Dept: MRI IMAGING | Facility: HOSPITAL | Age: 59
Discharge: HOME | End: 2024-07-16
Payer: COMMERCIAL

## 2024-07-16 DIAGNOSIS — C82.11 FOLLICULAR LYMPHOMA GRADE II OF LYMPH NODES OF HEAD, FACE, AND NECK (HCC): ICD-10-CM

## 2024-07-16 PROCEDURE — A9585 GADOBUTROL INJECTION: HCPCS | Performed by: RADIOLOGY

## 2024-07-16 PROCEDURE — 70553 MRI BRAIN STEM W/O & W/DYE: CPT

## 2024-07-16 RX ORDER — GADOBUTROL 604.72 MG/ML
10 INJECTION INTRAVENOUS
Status: COMPLETED | OUTPATIENT
Start: 2024-07-16 | End: 2024-07-16

## 2024-07-16 RX ADMIN — GADOBUTROL 10 ML: 604.72 INJECTION INTRAVENOUS at 11:40

## 2024-07-17 ENCOUNTER — OFFICE VISIT (OUTPATIENT)
Dept: PODIATRY | Age: 59
End: 2024-07-17
Payer: COMMERCIAL

## 2024-07-17 VITALS
DIASTOLIC BLOOD PRESSURE: 78 MMHG | TEMPERATURE: 97.9 F | HEART RATE: 86 BPM | WEIGHT: 235 LBS | SYSTOLIC BLOOD PRESSURE: 110 MMHG | OXYGEN SATURATION: 97 % | HEIGHT: 74 IN | BODY MASS INDEX: 30.16 KG/M2

## 2024-07-17 DIAGNOSIS — I73.9 PVD (PERIPHERAL VASCULAR DISEASE) (HCC): Primary | ICD-10-CM

## 2024-07-17 DIAGNOSIS — B35.1 ONYCHOMYCOSIS: ICD-10-CM

## 2024-07-17 PROCEDURE — 11721 DEBRIDE NAIL 6 OR MORE: CPT | Performed by: STUDENT IN AN ORGANIZED HEALTH CARE EDUCATION/TRAINING PROGRAM

## 2024-07-17 PROCEDURE — RECHECK: Performed by: STUDENT IN AN ORGANIZED HEALTH CARE EDUCATION/TRAINING PROGRAM

## 2024-07-17 NOTE — PROGRESS NOTES
Braden Watkins  1965  AT RISK FOOT CARE    1. PVD (peripheral vascular disease) ([facility])        2. Onychomycosis              Patient presents for at-risk foot care.  Patient has no acute concerns today.  Patient has significant lower extremity risk due to diminished pulses in the feet and trophic skin changes to the lower extremity including thick toenail, atrophic skin, and decreased hair growth.      On exam patient has thickened, hypertrophic, discolored, brittle toenails with subungual debris and tenderness x10  Patient has diminished pedal pulses and decreased perfusion to the lower extremities  Patient has significant trophic changes to the skin including thick toenails, decreased pedal hair and atrophic skin.     Today's treatment includes:  Debridement of toenails. Using nail nipper, dereje, and curette, nails were sharply debrided, reduced in thickness and length. Devitalized nail tissue and fungal debris excised and removed. Patient tolerated well.      Discussed proper shoe gear, daily inspections of feet, and general foot health with patient. Patient has Q8  findings and is recommended for at risk foot care every 9-10 weeks.    Patients most recent complete clinical foot exam was on: 1/22/24

## 2024-10-09 ENCOUNTER — OFFICE VISIT (OUTPATIENT)
Dept: PODIATRY | Age: 59
End: 2024-10-09
Payer: COMMERCIAL

## 2024-10-09 VITALS
SYSTOLIC BLOOD PRESSURE: 118 MMHG | HEART RATE: 68 BPM | TEMPERATURE: 97.7 F | BODY MASS INDEX: 30.16 KG/M2 | HEIGHT: 74 IN | WEIGHT: 235 LBS | OXYGEN SATURATION: 96 % | DIASTOLIC BLOOD PRESSURE: 76 MMHG

## 2024-10-09 DIAGNOSIS — B35.1 ONYCHOMYCOSIS: ICD-10-CM

## 2024-10-09 DIAGNOSIS — I73.9 PVD (PERIPHERAL VASCULAR DISEASE) (HCC): Primary | ICD-10-CM

## 2024-10-09 PROCEDURE — RECHECK: Performed by: STUDENT IN AN ORGANIZED HEALTH CARE EDUCATION/TRAINING PROGRAM

## 2024-10-09 PROCEDURE — 11721 DEBRIDE NAIL 6 OR MORE: CPT | Performed by: STUDENT IN AN ORGANIZED HEALTH CARE EDUCATION/TRAINING PROGRAM

## 2024-10-09 NOTE — PROGRESS NOTES
Braden Wtakins  1965  AT RISK FOOT CARE    1. PVD (peripheral vascular disease) ([facility])        2. Onychomycosis              Patient presents for at-risk foot care.  Patient has no acute concerns today.  Patient has significant lower extremity risk due to diminished pulses in the feet and trophic skin changes to the lower extremity including thick toenail, atrophic skin, and decreased hair growth.      On exam patient has thickened, hypertrophic, discolored, brittle toenails with subungual debris and tenderness x10  Patient has diminished pedal pulses and decreased perfusion to the lower extremities  Patient has significant trophic changes to the skin including thick toenails, decreased pedal hair and atrophic skin.     Today's treatment includes:  Debridement of toenails. Using nail nipper, dereje, and curette, nails were sharply debrided, reduced in thickness and length. Devitalized nail tissue and fungal debris excised and removed. Patient tolerated well.      Discussed proper shoe gear, daily inspections of feet, and general foot health with patient. Patient has Q8  findings and is recommended for at risk foot care every 9-10 weeks.    Patients most recent complete clinical foot exam was on: 1/22/24

## 2024-12-18 ENCOUNTER — OFFICE VISIT (OUTPATIENT)
Dept: PODIATRY | Age: 59
End: 2024-12-18
Payer: COMMERCIAL

## 2024-12-18 VITALS
OXYGEN SATURATION: 97 % | BODY MASS INDEX: 30.16 KG/M2 | SYSTOLIC BLOOD PRESSURE: 122 MMHG | HEIGHT: 74 IN | TEMPERATURE: 97.8 F | HEART RATE: 75 BPM | DIASTOLIC BLOOD PRESSURE: 68 MMHG | WEIGHT: 235 LBS

## 2024-12-18 DIAGNOSIS — I73.9 PVD (PERIPHERAL VASCULAR DISEASE) (HCC): Primary | ICD-10-CM

## 2024-12-18 DIAGNOSIS — B35.1 ONYCHOMYCOSIS: ICD-10-CM

## 2024-12-18 PROCEDURE — 11721 DEBRIDE NAIL 6 OR MORE: CPT | Performed by: STUDENT IN AN ORGANIZED HEALTH CARE EDUCATION/TRAINING PROGRAM

## 2024-12-18 PROCEDURE — RECHECK: Performed by: STUDENT IN AN ORGANIZED HEALTH CARE EDUCATION/TRAINING PROGRAM

## 2024-12-18 NOTE — PROGRESS NOTES
Braden Watkins  1965  AT RISK FOOT CARE    1. PVD (peripheral vascular disease) ([facility])        2. Onychomycosis              Patient presents for at-risk foot care.  Patient has no acute concerns today.  Patient has significant lower extremity risk due to diminished pulses in the feet and trophic skin changes to the lower extremity including thick toenail, atrophic skin, and decreased hair growth.      On exam patient has thickened, hypertrophic, discolored, brittle toenails with subungual debris and tenderness x10  Patient has diminished pedal pulses and decreased perfusion to the lower extremities  Patient has significant trophic changes to the skin including thick toenails, decreased pedal hair and atrophic skin.     Today's treatment includes:  Debridement of toenails. Using nail nipper, dereje, and curette, nails were sharply debrided, reduced in thickness and length. Devitalized nail tissue and fungal debris excised and removed. Patient tolerated well.      Discussed proper shoe gear, daily inspections of feet, and general foot health with patient. Patient has Q8  findings and is recommended for at risk foot care every 9-10 weeks.    Patients most recent complete clinical foot exam was on: 1/22/24

## 2025-05-13 ENCOUNTER — PROCEDURE VISIT (OUTPATIENT)
Dept: PODIATRY | Age: 60
End: 2025-05-13
Payer: COMMERCIAL

## 2025-05-13 VITALS — HEIGHT: 74 IN | BODY MASS INDEX: 30.17 KG/M2

## 2025-05-13 DIAGNOSIS — I73.9 PVD (PERIPHERAL VASCULAR DISEASE) (HCC): ICD-10-CM

## 2025-05-13 DIAGNOSIS — B35.1 ONYCHOMYCOSIS: Primary | ICD-10-CM

## 2025-05-13 PROCEDURE — 11721 DEBRIDE NAIL 6 OR MORE: CPT | Performed by: STUDENT IN AN ORGANIZED HEALTH CARE EDUCATION/TRAINING PROGRAM

## 2025-05-13 RX ORDER — TRAMADOL HYDROCHLORIDE 50 MG/1
50 TABLET ORAL EVERY 6 HOURS PRN
COMMUNITY
Start: 2025-03-17

## 2025-05-13 RX ORDER — DOCUSATE SODIUM 100 MG/1
100 CAPSULE, LIQUID FILLED ORAL 2 TIMES DAILY
COMMUNITY

## 2025-05-13 RX ORDER — HYDROCODONE BITARTRATE AND ACETAMINOPHEN 5; 325 MG/1; MG/1
1 TABLET ORAL EVERY 6 HOURS PRN
COMMUNITY
Start: 2025-02-25

## 2025-05-13 RX ORDER — NAPROXEN 500 MG/1
500 TABLET ORAL EVERY 12 HOURS PRN
COMMUNITY
Start: 2025-02-25

## 2025-05-13 NOTE — PROGRESS NOTES
Braden Watkins  1965  AT RISK FOOT CARE    1. Onychomycosis        2. PVD (peripheral vascular disease) (HCC)            Patient presents for at-risk foot care.  Patient has no acute concerns today.  Patient has significant lower extremity risk due to diminished pulses in the feet and trophic skin changes to the lower extremity including thick toenail, atrophic skin, and decreased hair growth.      On exam patient has thickened, hypertrophic, discolored, brittle toenails with subungual debris and tenderness x10  Patient has diminished pedal pulses and decreased perfusion to the lower extremities  Patient has significant trophic changes to the skin including thick toenails, decreased pedal hair and atrophic skin.     Today's treatment includes:  Debridement of toenails. Using nail nipper, dereje, and curette, nails were sharply debrided, reduced in thickness and length. Devitalized nail tissue and fungal debris excised and removed. Patient tolerated well.      Discussed proper shoe gear, daily inspections of feet, and general foot health with patient. Patient has Q8  findings and is recommended for at risk foot care every 9-10 weeks.    Patients most recent complete clinical foot exam was on: 5/13/25

## 2025-06-22 ENCOUNTER — APPOINTMENT (OUTPATIENT)
Dept: RADIOLOGY | Facility: CLINIC | Age: 60
End: 2025-06-22
Attending: PHYSICIAN ASSISTANT
Payer: COMMERCIAL

## 2025-06-22 ENCOUNTER — TELEPHONE (OUTPATIENT)
Dept: URGENT CARE | Facility: CLINIC | Age: 60
End: 2025-06-22

## 2025-06-22 ENCOUNTER — APPOINTMENT (OUTPATIENT)
Dept: URGENT CARE | Facility: CLINIC | Age: 60
End: 2025-06-22
Payer: COMMERCIAL

## 2025-06-22 ENCOUNTER — OFFICE VISIT (OUTPATIENT)
Dept: URGENT CARE | Facility: CLINIC | Age: 60
End: 2025-06-22
Payer: COMMERCIAL

## 2025-06-22 VITALS
TEMPERATURE: 98.7 F | BODY MASS INDEX: 30.16 KG/M2 | SYSTOLIC BLOOD PRESSURE: 124 MMHG | DIASTOLIC BLOOD PRESSURE: 78 MMHG | OXYGEN SATURATION: 97 % | HEIGHT: 74 IN | RESPIRATION RATE: 24 BRPM | HEART RATE: 86 BPM | WEIGHT: 235 LBS

## 2025-06-22 DIAGNOSIS — B96.89 ACUTE BACTERIAL BRONCHITIS: Primary | ICD-10-CM

## 2025-06-22 DIAGNOSIS — J98.01 BRONCHOSPASM: ICD-10-CM

## 2025-06-22 DIAGNOSIS — J20.8 ACUTE BACTERIAL BRONCHITIS: Primary | ICD-10-CM

## 2025-06-22 DIAGNOSIS — R06.2 WHEEZING: ICD-10-CM

## 2025-06-22 PROCEDURE — G0463 HOSPITAL OUTPT CLINIC VISIT: HCPCS | Performed by: PHYSICIAN ASSISTANT

## 2025-06-22 PROCEDURE — 99204 OFFICE O/P NEW MOD 45 MIN: CPT | Performed by: PHYSICIAN ASSISTANT

## 2025-06-22 PROCEDURE — 71046 X-RAY EXAM CHEST 2 VIEWS: CPT

## 2025-06-22 PROCEDURE — 94640 AIRWAY INHALATION TREATMENT: CPT | Performed by: PHYSICIAN ASSISTANT

## 2025-06-22 RX ORDER — IPRATROPIUM BROMIDE AND ALBUTEROL SULFATE 2.5; .5 MG/3ML; MG/3ML
3 SOLUTION RESPIRATORY (INHALATION) ONCE
Status: COMPLETED | OUTPATIENT
Start: 2025-06-22 | End: 2025-06-22

## 2025-06-22 RX ORDER — AZITHROMYCIN 250 MG/1
TABLET, FILM COATED ORAL
Qty: 6 TABLET | Refills: 0 | Status: SHIPPED | OUTPATIENT
Start: 2025-06-22 | End: 2025-06-22

## 2025-06-22 RX ORDER — BENZONATATE 200 MG/1
200 CAPSULE ORAL 3 TIMES DAILY PRN
Qty: 20 CAPSULE | Refills: 0 | Status: SHIPPED | OUTPATIENT
Start: 2025-06-22

## 2025-06-22 RX ORDER — IPRATROPIUM BROMIDE AND ALBUTEROL SULFATE 2.5; .5 MG/3ML; MG/3ML
3 SOLUTION RESPIRATORY (INHALATION) EVERY 6 HOURS PRN
Qty: 120 ML | Refills: 0 | Status: SHIPPED | OUTPATIENT
Start: 2025-06-22 | End: 2025-06-22

## 2025-06-22 RX ORDER — AZITHROMYCIN 250 MG/1
TABLET, FILM COATED ORAL
Qty: 6 TABLET | Refills: 0 | Status: SHIPPED | OUTPATIENT
Start: 2025-06-22 | End: 2025-06-26

## 2025-06-22 RX ORDER — PREDNISONE 10 MG/1
TABLET ORAL
Qty: 21 TABLET | Refills: 0 | Status: SHIPPED | OUTPATIENT
Start: 2025-06-22 | End: 2025-06-22

## 2025-06-22 RX ORDER — BENZONATATE 200 MG/1
200 CAPSULE ORAL 3 TIMES DAILY PRN
Qty: 20 CAPSULE | Refills: 0 | Status: SHIPPED | OUTPATIENT
Start: 2025-06-22 | End: 2025-06-22

## 2025-06-22 RX ORDER — GABAPENTIN 100 MG/1
100 CAPSULE ORAL 3 TIMES DAILY
COMMUNITY

## 2025-06-22 RX ORDER — IPRATROPIUM BROMIDE AND ALBUTEROL SULFATE 2.5; .5 MG/3ML; MG/3ML
3 SOLUTION RESPIRATORY (INHALATION) EVERY 6 HOURS PRN
Qty: 120 ML | Refills: 0 | Status: SHIPPED | OUTPATIENT
Start: 2025-06-22

## 2025-06-22 RX ORDER — PREDNISONE 10 MG/1
TABLET ORAL
Qty: 21 TABLET | Refills: 0 | Status: SHIPPED | OUTPATIENT
Start: 2025-06-22

## 2025-06-22 RX ADMIN — IPRATROPIUM BROMIDE AND ALBUTEROL SULFATE 3 ML: 2.5; .5 SOLUTION RESPIRATORY (INHALATION) at 13:07

## 2025-06-22 NOTE — TELEPHONE ENCOUNTER
Patient called left message; prescriptions did not go through to [facility]. In checking the chart , pharmacy listed as [facility] pharmacy. Pharmacy changed in commputer and PEDRITO Stallings notified of same.

## 2025-06-22 NOTE — PROGRESS NOTES
[facility]  Name: Braden Watkins      : 1965      MRN: [id number]  Encounter Provider: Giovanna Stallings PA-C  Encounter Date: 2025   Encounter department: [facility] [place], [place]  :  Assessment & Plan  Acute bacterial bronchitis    Orders:    ipratropium-albuterol (DUO-NEB) 0.5-2.5 mg/3 mL nebulizer solution; Take 3 mL by nebulization every 6 (six) hours as needed for wheezing or shortness of breath    predniSONE 10 mg tablet; Take 6 pills day one, 5 pills day 2, 4 pills day 3, 3 pills day 4, 2 pills day 5, and 1 pill day 6.    benzonatate (TESSALON) 200 MG capsule; Take 1 capsule (200 mg total) by mouth 3 (three) times a day as needed for cough    azithromycin (ZITHROMAX) 250 mg tablet; Take 2 tablets today then 1 tablet daily x 4 days    Bronchospasm    Orders:    XR chest pa and lateral; Future    ipratropium-albuterol (DUO-NEB) 0.5-2.5 mg/3 mL inhalation solution 3 mL    Mini neb    CXR provider read: increased interstitial markings without other acute cardiopulmonary disease.  Duoneb administered in office.   Will tx with abx given risk factors for bacterial bronchitis.   Assessment & Plan        Patient Instructions  Coughs are typically most bothersome the first 1-2 weeks. Coughs frequently linger for 4-6 weeks. However, have your lungs re-evaluated if you develop sudden worsening cough, shortness or breath or chest discomfort.     Prednisone, Duoneb, Azithromycin, Tessalon as prescribed  Follow up with PCP in 3-5 days.  Proceed to  ER if symptoms worsen.    If tests are performed, our office will contact you with results only if changes need to made to the care plan discussed with you at the visit. You can review your full results on [facility] MyChart.    Eat yogurt with live and active cultures and/or take a probiotic at least 3 hours before or after antibiotic dose. Monitor stool for diarrhea and/or blood. If this occurs, contact primary care doctor  ASAP.       Chief Complaint:   Chief Complaint   Patient presents with    Shortness of Breath     C/O of nasal congestion, cough, wheezing x 2 weeks. Seen by PCP 2 weeks ago and given a course of steroids which completed reports now worse. Wheezing and rhonchi noted.      History of Present Illness   History of Present Illness      59 y/o M presents with 2 week history of NC, cough, congestion, and wheezing. Patient was prescribed steroids and albuterol by PCP which he has since finished. Reports worsening wheezing after completion. Denies fevers or chills. Suspects history of asthma without definitive diagnosis/workup. No hx/o COPD. Patient last used albuterol inhaler at 9am.     Review of Systems   Constitutional:  Negative for chills and fever.   HENT:  Positive for congestion. Negative for dental problem, ear discharge, ear pain, facial swelling, postnasal drip, rhinorrhea, sinus pressure, sinus pain, sneezing, sore throat and trouble swallowing.    Eyes:  Negative for itching.   Respiratory:  Positive for cough, chest tightness, shortness of breath and wheezing.    Cardiovascular:  Negative for chest pain and palpitations.   Gastrointestinal:  Negative for constipation, diarrhea, nausea and vomiting.   Musculoskeletal:  Negative for myalgias.   Skin:  Negative for rash.   Neurological:  Negative for dizziness, weakness, light-headedness and headaches.     Past Medical History   Past Medical History[1]  Past Surgical History[2]  Family History[3]  he reports that he has been smoking cigarettes. He has quit using smokeless tobacco.  His smokeless tobacco use included chew. He reports that he does not currently use alcohol. He reports that he does not use drugs.  Current Outpatient Medications   Medication Instructions    albuterol (PROVENTIL HFA,VENTOLIN HFA) 90 mcg/act inhaler 2 puffs, 4 times daily    ammonium lactate (LAC-HYDRIN) 12 % cream Topical, As needed    amoxicillin-clavulanate (AUGMENTIN) 875-125 mg  "per tablet 875 mg, 2 times daily    Aspirin Low Dose 81 MG chewable tablet     atorvastatin (LIPITOR) 40 mg, Daily    azithromycin (ZITHROMAX) 250 mg tablet Take 2 tablets today then 1 tablet daily x 4 days    benzonatate (TESSALON) 200 mg, Oral, 3 times daily PRN    bisacodyl (DULCOLAX) 10 mg, Daily    capsaicin (ZOSTRIX) 0.025 % cream     dicyclomine (BENTYL) 20 mg, Every 6 hours PRN    docusate sodium (COLACE) 100 mg, 2 times daily    esomeprazole (NexIUM) 40 MG capsule 1 capsule, Daily before breakfast    ezetimibe (ZETIA) 10 mg, Daily    fluticasone (FLONASE) 50 mcg/act nasal spray 2 sprays, Daily PRN    gabapentin (NEURONTIN) 100 mg, 3 times daily    HYDROcodone-acetaminophen (NORCO) 5-325 mg per tablet 1 tablet, Every 6 hours PRN    ipratropium-albuterol (DUO-NEB) 0.5-2.5 mg/3 mL nebulizer solution 3 mL, Nebulization, Every 6 hours PRN    lisinopril (ZESTRIL) 5 mg, Oral, Daily    melatonin 3 mg, Daily at bedtime    naproxen (NAPROSYN) 500 mg, Every 12 hours PRN    nicotine polacrilex (COMMIT) 4 mg    ondansetron (ZOFRAN) 4 mg, Every 6 hours PRN    pantoprazole (PROTONIX) 40 mg tablet     polyethylene glycol (GLYCOLAX) 17 g, As needed    predniSONE 10 mg tablet Take 6 pills day one, 5 pills day 2, 4 pills day 3, 3 pills day 4, 2 pills day 5, and 1 pill day 6.    saccharomyces boulardii (FLORASTOR) 250 mg, Daily    senna-docusate sodium (SENOKOT S) 8.6-50 mg per tablet 1 tablet    sertraline (ZOLOFT) 100 mg, Every morning    traMADol (ULTRAM) 50 mg, Every 6 hours PRN   Allergies[4]     Objective   /78   Pulse 86   Temp 98.7 °F (37.1 °C)   Resp (!) 24   Ht 6' 2\" (1.88 m)   Wt 107 kg (235 lb)   SpO2 97%   BMI 30.17 kg/m²      Physical Exam  Vitals reviewed.   Constitutional:       General: He is not in acute distress.     Appearance: He is well-developed. He is not diaphoretic.   HENT:      Head: Normocephalic.      Right Ear: Tympanic membrane, ear canal and external ear normal.      Left Ear: " "Tympanic membrane, ear canal and external ear normal.      Nose: Nose normal.      Mouth/Throat:      Pharynx: No oropharyngeal exudate or posterior oropharyngeal erythema.     Eyes:      Conjunctiva/sclera: Conjunctivae normal.       Cardiovascular:      Rate and Rhythm: Normal rate and regular rhythm.      Heart sounds: Normal heart sounds. No murmur heard.     No friction rub. No gallop.   Pulmonary:      Effort: Pulmonary effort is normal. No respiratory distress.      Breath sounds: Wheezing present. No rhonchi or rales.     Skin:     General: Skin is warm.     Neurological:      Mental Status: He is alert and oriented to person, place, and time.     Psychiatric:         Behavior: Behavior normal.         Thought Content: Thought content normal.         Judgment: Judgment normal.     Mini neb    Performed by: Giovanna Stallings PA-C  Authorized by: Giovanna Stallings PA-C    Universal Protocol:  Consent: Verbal consent obtained  Risks and benefits: risks, benefits and alternatives were discussed  Consent given by: patient  Patient identity confirmed: verbally with patient    Number of treatments:  1  Treatment 1:   Pre-Procedure     Symptoms:  Wheezing, labored breathing and cough    Lung Sounds:  Tight and wheezing    HR:  84    RR:  36    SP02:  97    Medication Administered:  Duoneb - Albuterol 2.5 mg/Atrovent 0.5 mg  Post-Procedure     Symptoms:  Wheezing    Lung sounds:  Wheezing and rhonchi.    HR:  86    RR:  24    SP02:  97      Physical Exam      Results    Portions of the record may have been created with voice recognition software.  Occasional wrong word or \"sound a like\" substitutions may have occurred due to the inherent limitations of voice recognition software.  Read the chart carefully and recognize, using context, where substitutions have occurred.         [1]   Past Medical History:  Diagnosis Date    Acute left hemiparesis (HCC)     Alcohol abuse     Bowel obstruction (HCC)     Depression "     GERD (gastroesophageal reflux disease)     Hyperlipidemia     Hypertension     Lymphoma (HCC)     MI (myocardial infarction) (HCC)     Neck mass     Obesity     Stroke (HCC)     Wheelchair dependent    [2]   Past Surgical History:  Procedure Laterality Date    BRAIN SURGERY      CARDIAC SURGERY      HERNIA REPAIR      inguinal    IR BIOPSY BONE MARROW  9/27/2023    IR BIOPSY NECK  07/05/2023    RI EXCISION SUBMANDIBULAR SUBMAXILLARY GLAND Left 8/30/2023    Procedure: SUBMANDIBULAR LYMPH NODE EXCISION WITH NEUROMUSCULAR MONITORING TO PROVIDE COVERAGE OF FACIAL NERVE;  Surgeon: Andrea De MD;  Location:  MAIN OR;  Service: ENT   [3]   Family History  Problem Relation Name Age of Onset    Hypertension Mother      Diabetes Mother      Cancer Mother      Dementia Father      Lung disease Father     [4]   Allergies  Allergen Reactions    Iodinated Contrast Media Seizures     Family reports he had a seizure when given IV contrast dye

## 2025-06-22 NOTE — PATIENT INSTRUCTIONS
Coughs are typically most bothersome the first 1-2 weeks. Coughs frequently linger for 4-6 weeks. However, have your lungs re-evaluated if you develop sudden worsening cough, shortness or breath or chest discomfort.     Prednisone, Duoneb, Azithromycin, Tessalon as prescribed  Follow up with PCP in 3-5 days.  Proceed to  ER if symptoms worsen.    If tests are performed, our office will contact you with results only if changes need to made to the care plan discussed with you at the visit. You can review your full results on [facility] MyChart.    Eat yogurt with live and active cultures and/or take a probiotic at least 3 hours before or after antibiotic dose. Monitor stool for diarrhea and/or blood. If this occurs, contact primary care doctor ASAP.

## 2025-07-22 RX ORDER — BUPROPION HYDROCHLORIDE 150 MG/1
150 TABLET ORAL DAILY
COMMUNITY
Start: 2025-07-08

## 2025-07-23 ENCOUNTER — PROCEDURE VISIT (OUTPATIENT)
Dept: PODIATRY | Age: 60
End: 2025-07-23
Payer: COMMERCIAL

## 2025-07-23 VITALS — BODY MASS INDEX: 30.16 KG/M2 | HEIGHT: 74 IN | WEIGHT: 235 LBS

## 2025-07-23 DIAGNOSIS — I73.9 PVD (PERIPHERAL VASCULAR DISEASE) (HCC): ICD-10-CM

## 2025-07-23 DIAGNOSIS — B35.1 ONYCHOMYCOSIS: ICD-10-CM

## 2025-07-23 DIAGNOSIS — B35.3 TINEA PEDIS OF BOTH FEET: Primary | ICD-10-CM

## 2025-07-23 DIAGNOSIS — M20.42 HAMMER TOE OF LEFT FOOT: ICD-10-CM

## 2025-07-23 PROCEDURE — 99213 OFFICE O/P EST LOW 20 MIN: CPT | Performed by: STUDENT IN AN ORGANIZED HEALTH CARE EDUCATION/TRAINING PROGRAM

## 2025-07-23 PROCEDURE — 11721 DEBRIDE NAIL 6 OR MORE: CPT | Performed by: STUDENT IN AN ORGANIZED HEALTH CARE EDUCATION/TRAINING PROGRAM

## 2025-07-23 RX ORDER — KETOCONAZOLE 20 MG/ML
1 SHAMPOO, SUSPENSION TOPICAL 3 TIMES WEEKLY
Qty: 120 ML | Refills: 3 | Status: SHIPPED | OUTPATIENT
Start: 2025-07-23

## 2025-07-23 NOTE — PROGRESS NOTES
Braden Watkins  1965  AT RISK FOOT CARE    1. Tinea pedis of both feet  ketoconazole (NIZORAL) 2 % shampoo      2. Onychomycosis        3. PVD (peripheral vascular disease) ([facility])        4. Hammer toe of left foot              Patient presents for at-risk foot care.  Patient has no acute concerns today.  Patient has significant lower extremity risk due to diminished pulses in the feet and trophic skin changes to the lower extremity including thick toenail, atrophic skin, and decreased hair growth.      On exam patient has thickened, hypertrophic, discolored, brittle toenails with subungual debris and tenderness x10  Patient has diminished pedal pulses and decreased perfusion to the lower extremities  Patient has significant trophic changes to the skin including thick toenails, decreased pedal hair and atrophic skin.   L dorsal 2nd toe thin HPK due to HT.   B/L plantar foot tinea pedis.     Today's treatment includes:  Debridement of toenails. Using nail nipper, dereje, and curette, nails were sharply debrided, reduced in thickness and length. Devitalized nail tissue and fungal debris excised and removed. Patient tolerated well.      C/w amlactin for dry skni daily    Antifungal foot wash rx'd for 3x/wk use to feet.     Donut pad applied to left 2nd toe, to be worn daily. Monitor for drainage. Make sure shoes are soft and wide in toe box.    Discussed proper shoe gear, daily inspections of feet, and general foot health with patient. Patient has Q8  findings and is recommended for at risk foot care every 9-10 weeks.    Patients most recent complete clinical foot exam was on: 5/13/25

## (undated) DEVICE — SUT ETHILON 3-0 FS-1 18 IN 663G

## (undated) DEVICE — ADHESIVE SKIN CLSR DERMABOND NX

## (undated) DEVICE — INTENDED FOR TISSUE SEPARATION, AND OTHER PROCEDURES THAT REQUIRE A SHARP SURGICAL BLADE TO PUNCTURE OR CUT.: Brand: BARD-PARKER SAFETY BLADES SIZE 15, STERILE

## (undated) DEVICE — EVACUATOR BULB 100CC SILICONE

## (undated) DEVICE — NEEDLE 22 G X 1 1/2 SAFETY

## (undated) DEVICE — ADHESIVE SKIN HIGH VISCOSITY EXOFIN 1ML

## (undated) DEVICE — PAD GROUNDING ADULT

## (undated) DEVICE — PACK UNIVERSAL NECK

## (undated) DEVICE — JP PERF DRN SIL FLT 7MM FULL: Brand: CARDINAL HEALTH

## (undated) DEVICE — ELECTRODE BLADE MOD E-Z CLEAN 2.5IN 6.4CM -0012M

## (undated) DEVICE — DISPOSABLE OR TOWEL: Brand: CARDINAL HEALTH

## (undated) DEVICE — BULB SYRINGE,IRRIGATION WITH PROTECTIVE CAP: Brand: DOVER

## (undated) DEVICE — SUT CHROMIC 4-0 RB-1 27 IN U203H

## (undated) DEVICE — CHLORAPREP HI-LITE 10.5ML ORANGE

## (undated) DEVICE — GLOVE INDICATOR PI UNDERGLOVE SZ 8 BLUE

## (undated) DEVICE — SUT SILK 2-0 TIES 144 IN LA55G

## (undated) DEVICE — SKIN MARKER DUAL TIP WITH RULER CAP, FLEXIBLE RULER AND LABELS: Brand: DEVON

## (undated) DEVICE — SINGLE PORT MANIFOLD: Brand: NEPTUNE 2